# Patient Record
Sex: FEMALE | Race: BLACK OR AFRICAN AMERICAN | NOT HISPANIC OR LATINO | Employment: FULL TIME | URBAN - METROPOLITAN AREA
[De-identification: names, ages, dates, MRNs, and addresses within clinical notes are randomized per-mention and may not be internally consistent; named-entity substitution may affect disease eponyms.]

---

## 2017-09-26 ENCOUNTER — TRANSFERRED RECORDS (OUTPATIENT)
Dept: HEALTH INFORMATION MANAGEMENT | Facility: CLINIC | Age: 57
End: 2017-09-26

## 2017-12-03 ENCOUNTER — HEALTH MAINTENANCE LETTER (OUTPATIENT)
Age: 57
End: 2017-12-03

## 2020-03-02 ENCOUNTER — HEALTH MAINTENANCE LETTER (OUTPATIENT)
Age: 60
End: 2020-03-02

## 2020-12-20 ENCOUNTER — HEALTH MAINTENANCE LETTER (OUTPATIENT)
Age: 60
End: 2020-12-20

## 2021-04-18 ENCOUNTER — HEALTH MAINTENANCE LETTER (OUTPATIENT)
Age: 61
End: 2021-04-18

## 2021-07-28 ENCOUNTER — OFFICE VISIT (OUTPATIENT)
Dept: FAMILY MEDICINE | Facility: CLINIC | Age: 61
End: 2021-07-28
Payer: COMMERCIAL

## 2021-07-28 ENCOUNTER — TELEPHONE (OUTPATIENT)
Dept: PSYCHOLOGY | Facility: CLINIC | Age: 61
End: 2021-07-28

## 2021-07-28 ENCOUNTER — TELEPHONE (OUTPATIENT)
Dept: OTOLARYNGOLOGY | Facility: CLINIC | Age: 61
End: 2021-07-28

## 2021-07-28 VITALS
TEMPERATURE: 98.2 F | WEIGHT: 215.6 LBS | HEIGHT: 64 IN | SYSTOLIC BLOOD PRESSURE: 133 MMHG | DIASTOLIC BLOOD PRESSURE: 82 MMHG | HEART RATE: 87 BPM | BODY MASS INDEX: 36.81 KG/M2 | OXYGEN SATURATION: 95 %

## 2021-07-28 DIAGNOSIS — Z12.4 SCREENING FOR CERVICAL CANCER: ICD-10-CM

## 2021-07-28 DIAGNOSIS — K12.2 SUBMANDIBULAR ABSCESS: ICD-10-CM

## 2021-07-28 DIAGNOSIS — Z11.51 SCREENING FOR HUMAN PAPILLOMAVIRUS: ICD-10-CM

## 2021-07-28 DIAGNOSIS — Z12.11 SCREENING FOR COLON CANCER: ICD-10-CM

## 2021-07-28 DIAGNOSIS — I10 BENIGN ESSENTIAL HYPERTENSION: ICD-10-CM

## 2021-07-28 DIAGNOSIS — F33.1 MODERATE EPISODE OF RECURRENT MAJOR DEPRESSIVE DISORDER (H): ICD-10-CM

## 2021-07-28 DIAGNOSIS — Z12.31 VISIT FOR SCREENING MAMMOGRAM: ICD-10-CM

## 2021-07-28 DIAGNOSIS — Z86.19 HISTORY OF HEPATITIS C: ICD-10-CM

## 2021-07-28 DIAGNOSIS — H53.9 VISION CHANGES: ICD-10-CM

## 2021-07-28 DIAGNOSIS — R06.83 SNORING: ICD-10-CM

## 2021-07-28 DIAGNOSIS — Z11.4 ENCOUNTER FOR SCREENING FOR HIV: ICD-10-CM

## 2021-07-28 DIAGNOSIS — Z71.6 ENCOUNTER FOR SMOKING CESSATION COUNSELING: ICD-10-CM

## 2021-07-28 DIAGNOSIS — Z00.00 ROUTINE GENERAL MEDICAL EXAMINATION AT A HEALTH CARE FACILITY: Primary | ICD-10-CM

## 2021-07-28 DIAGNOSIS — Z76.89 ENCOUNTER TO ESTABLISH CARE WITH NEW DOCTOR: ICD-10-CM

## 2021-07-28 DIAGNOSIS — Z87.891 PERSONAL HISTORY OF TOBACCO USE: ICD-10-CM

## 2021-07-28 PROBLEM — F32.A DEPRESSION: Status: ACTIVE | Noted: 2021-07-28

## 2021-07-28 PROBLEM — R91.8 ABNORMAL FINDINGS ON DIAGNOSTIC IMAGING OF LUNG: Status: ACTIVE | Noted: 2019-03-22

## 2021-07-28 PROBLEM — T78.3XXA ANGIO-EDEMA: Status: ACTIVE | Noted: 2021-07-28

## 2021-07-28 LAB
ALBUMIN SERPL-MCNC: 3.8 G/DL (ref 3.4–5)
ALP SERPL-CCNC: 69 U/L (ref 40–150)
ALT SERPL W P-5'-P-CCNC: 20 U/L (ref 0–50)
ANION GAP SERPL CALCULATED.3IONS-SCNC: 7 MMOL/L (ref 3–14)
AST SERPL W P-5'-P-CCNC: 16 U/L (ref 0–45)
BILIRUB SERPL-MCNC: 0.3 MG/DL (ref 0.2–1.3)
BUN SERPL-MCNC: 13 MG/DL (ref 7–30)
CALCIUM SERPL-MCNC: 9.2 MG/DL (ref 8.5–10.1)
CHLORIDE BLD-SCNC: 108 MMOL/L (ref 94–109)
CO2 SERPL-SCNC: 23 MMOL/L (ref 20–32)
CREAT SERPL-MCNC: 0.71 MG/DL (ref 0.52–1.04)
CREAT UR-MCNC: 89 MG/DL
GFR SERPL CREATININE-BSD FRML MDRD: >90 ML/MIN/1.73M2
GLUCOSE BLD-MCNC: 86 MG/DL (ref 70–99)
POTASSIUM BLD-SCNC: 4.9 MMOL/L (ref 3.4–5.3)
PROT SERPL-MCNC: 7.8 G/DL (ref 6.8–8.8)
PROT UR-MCNC: 0.17 G/L
PROT/CREAT 24H UR: 0.19 G/G CR (ref 0–0.2)
SODIUM SERPL-SCNC: 138 MMOL/L (ref 133–144)

## 2021-07-28 PROCEDURE — 80053 COMPREHEN METABOLIC PANEL: CPT | Performed by: STUDENT IN AN ORGANIZED HEALTH CARE EDUCATION/TRAINING PROGRAM

## 2021-07-28 PROCEDURE — 36415 COLL VENOUS BLD VENIPUNCTURE: CPT | Performed by: STUDENT IN AN ORGANIZED HEALTH CARE EDUCATION/TRAINING PROGRAM

## 2021-07-28 PROCEDURE — 87522 HEPATITIS C REVRS TRNSCRPJ: CPT | Performed by: STUDENT IN AN ORGANIZED HEALTH CARE EDUCATION/TRAINING PROGRAM

## 2021-07-28 PROCEDURE — 87389 HIV-1 AG W/HIV-1&-2 AB AG IA: CPT | Performed by: STUDENT IN AN ORGANIZED HEALTH CARE EDUCATION/TRAINING PROGRAM

## 2021-07-28 PROCEDURE — 99214 OFFICE O/P EST MOD 30 MIN: CPT | Mod: 25 | Performed by: STUDENT IN AN ORGANIZED HEALTH CARE EDUCATION/TRAINING PROGRAM

## 2021-07-28 PROCEDURE — G0145 SCR C/V CYTO,THINLAYER,RESCR: HCPCS | Performed by: STUDENT IN AN ORGANIZED HEALTH CARE EDUCATION/TRAINING PROGRAM

## 2021-07-28 PROCEDURE — 84156 ASSAY OF PROTEIN URINE: CPT | Performed by: STUDENT IN AN ORGANIZED HEALTH CARE EDUCATION/TRAINING PROGRAM

## 2021-07-28 PROCEDURE — 99386 PREV VISIT NEW AGE 40-64: CPT | Mod: GC | Performed by: STUDENT IN AN ORGANIZED HEALTH CARE EDUCATION/TRAINING PROGRAM

## 2021-07-28 PROCEDURE — 87624 HPV HI-RISK TYP POOLED RSLT: CPT | Performed by: STUDENT IN AN ORGANIZED HEALTH CARE EDUCATION/TRAINING PROGRAM

## 2021-07-28 RX ORDER — NICOTINE 21 MG/24HR
1 PATCH, TRANSDERMAL 24 HOURS TRANSDERMAL
COMMUNITY
Start: 2021-07-16

## 2021-07-28 RX ORDER — CETIRIZINE HYDROCHLORIDE 10 MG/1
10 TABLET ORAL PRN
COMMUNITY
End: 2021-08-12

## 2021-07-28 RX ORDER — AMLODIPINE BESYLATE 10 MG/1
10 TABLET ORAL DAILY
COMMUNITY
Start: 2020-03-30 | End: 2021-08-17

## 2021-07-28 RX ORDER — SIMVASTATIN 20 MG
20 TABLET ORAL DAILY
COMMUNITY
Start: 2021-07-15 | End: 2021-08-17

## 2021-07-28 ASSESSMENT — ANXIETY QUESTIONNAIRES
GAD7 TOTAL SCORE: 7
7. FEELING AFRAID AS IF SOMETHING AWFUL MIGHT HAPPEN: SEVERAL DAYS
2. NOT BEING ABLE TO STOP OR CONTROL WORRYING: SEVERAL DAYS
6. BECOMING EASILY ANNOYED OR IRRITABLE: SEVERAL DAYS
5. BEING SO RESTLESS THAT IT IS HARD TO SIT STILL: NOT AT ALL
IF YOU CHECKED OFF ANY PROBLEMS ON THIS QUESTIONNAIRE, HOW DIFFICULT HAVE THESE PROBLEMS MADE IT FOR YOU TO DO YOUR WORK, TAKE CARE OF THINGS AT HOME, OR GET ALONG WITH OTHER PEOPLE: NOT DIFFICULT AT ALL
3. WORRYING TOO MUCH ABOUT DIFFERENT THINGS: MORE THAN HALF THE DAYS
1. FEELING NERVOUS, ANXIOUS, OR ON EDGE: SEVERAL DAYS

## 2021-07-28 ASSESSMENT — PATIENT HEALTH QUESTIONNAIRE - PHQ9
5. POOR APPETITE OR OVEREATING: SEVERAL DAYS
SUM OF ALL RESPONSES TO PHQ QUESTIONS 1-9: 7

## 2021-07-28 ASSESSMENT — MIFFLIN-ST. JEOR: SCORE: 1515.02

## 2021-07-28 NOTE — PATIENT INSTRUCTIONS
Patient Education   Here is the plan from today's visit    1. Routine general medical examination at a health care facility    2. Visit for screening mammogram  Mammogram referral  - MA SCREENING DIGITAL BILAT; Future    3. Screening for cervical cancer  We did a pap smear today.    4. Screening for colon cancer  Colonoscopy referral  - Adult Gastro Ref - Procedure Only; Future    6. Screening for human papillomavirus    7. Personal history of tobacco use  Lung cancer screening  - Prof fee: Shared Decisionmaking for Lung Cancer Screening  - CT Chest Lung Cancer Scrn Low Dose wo; Future    8. Encounter for smoking cessation counseling    9. Moderate episode of recurrent major depressive disorder (H)  - BEHAVIORAL HEALTH REFERRAL ([facility] interal and external)    10. Vision changes  - Adult Eye Referral; Future    11. Encounter for screening for HIV  - HIV Antigen Antibody Combo; Future    12. History of hepatitis C  - Hepatitis C RNA quantitative; Future    13. Benign essential hypertension  Continue taking amlodipine!  - Comprehensive Metabolic Panel; Future  - Protein  random urine with Creat Ratio; Future    14. Snoring  Sleepy study.   - SLEEP EVALUATION & MANAGEMENT REFERRAL - ADULT -; Future    15. Submandibular abscess  - OTOLARYNGOLOGY REFERRAL - INTERNAL    Please call or return to clinic if your symptoms don't go away.    Follow up plan  Return for 2 weeks, follow-up on mental health and asthma .    Thank you for coming to [facility] today.  COVID-19 Vaccine:  If you are eligible for the COVID-19 vaccine, you can schedule via [employer] or call [employer] at [phone]. If you need assistance with scheduling, please speak to a Care Coordinator or your provider.   Lab Testing:  **If you had lab testing today and your results are reassuring or normal they will be mailed to you or sent through [employer] within 7 days.   **If the lab tests need quick action we will call you with the results.  **If  you are having labs done on a different day, please call [phone] to schedule at [facility] or [phone] for other [facility] locations.   The phone number we will call with results is # [phone] (home) . If this is not the best number please call our clinic and change the number.  Medication Refills:  If you need any refills please call your pharmacy and they will contact us.   If you need to  your refill at a new pharmacy, please contact the new pharmacy directly. The new pharmacy will help you get your medications transferred faster.   Scheduling:  If you have any concerns about today's visit or wish to schedule another appointment please call our office during normal business hours [phone] (8-5:00 M-F)  If a referral was made to a [facility] and you don't get a call from central scheduling please call [phone].  If a Mammogram was ordered for you at [facility] call [phone] to schedule or change your appointment.  If you had an EKG/XRay/CT/Ultrasound/MRI ordered the number is [phone] to schedule or change your radiology appointment.   Medical Concerns:  If you have urgent medical concerns please call [phone] at any time of the day.    Shawna Pelayo, DO             Preventive Health Recommendations  Female Ages 50 - 64    Yearly exam: See your health care provider every year in order to  o Review health changes.   o Discuss preventive care.    o Review your medicines if your doctor has prescribed any.      Get a Pap test every three years (unless you have an abnormal result and your provider advises testing more often).    If you get Pap tests with HPV test, you only need to test every 5 years, unless you have an abnormal result.     You do not need a Pap test if your uterus was removed (hysterectomy) and you have not had cancer.    You should be tested each year for STDs (sexually transmitted diseases) if you're at risk.      Have a mammogram every 1 to 2 years.    Have a colonoscopy at age 50, or have a yearly FIT test (stool test). These exams screen for colon cancer.      Have a cholesterol test every 5 years, or more often if advised.    Have a diabetes test (fasting glucose) every three years. If you are at risk for diabetes, you should have this test more often.     If you are at risk for osteoporosis (brittle bone disease), think about having a bone density scan (DEXA).    Shots: Get a flu shot each year. Get a tetanus shot every 10 years.    Nutrition:     Eat at least 5 servings of fruits and vegetables each day.    Eat whole-grain bread, whole-wheat pasta and brown rice instead of white grains and rice.    Get adequate Calcium and Vitamin D.     Lifestyle    Exercise at least 150 minutes a week (30 minutes a day, 5 days a week). This will help you control your weight and prevent disease.    Limit alcohol to one drink per day.    No smoking.     Wear sunscreen to prevent skin cancer.     See your dentist every six months for an exam and cleaning.    See your eye doctor every 1 to 2 years.          Lung Cancer Screening   Frequently Asked Questions  If you are at high-risk for lung cancer, getting screened with low-dose computed tomography (LDCT) every year can help save your life. This handout offers answers to some of the most common questions about lung cancer screening. If you have other questions, please call [phone] ([phone]).     What is it?  Lung cancer screening uses special X-ray technology to create an image of your lung tissue. The exam is quick and easy and takes less than 10 seconds. We don t give you any medicine or use any needles. You can eat before and after the exam. You don t need to change your clothes as long as the clothing on your chest doesn t contain metal. But, you do need to be able to hold your breath for at least 6 seconds during the exam.    What is the goal of lung cancer  screening?  The goal of lung cancer screening is to save lives. Many times, lung cancer is not found until a person starts having physical symptoms. Lung cancer screening can help detect lung cancer in the earliest stages when it may be easier to treat.    Who should be screened for lung cancer?  We suggest lung cancer screening for anyone who is at high-risk for lung cancer. You are in the high-risk group if you:      are between the ages of 55 and 79, and    have smoked at least 1 pack of cigarettes a day for 30 or more years, and    still smoke or have quit within the past 15 years.    However, if you have a new cough or shortness of breath, you should talk to your doctor before being screened.    Some national lung health advocacy groups also recommend screening for people ages 50 to 79 who have smoked an average of 1 pack of cigarettes a day for 20 years. They must also have at least 1 other risk factor for lung cancer, not including exposure to secondhand smoke. Other risk factors are having had cancer in the past, emphysema, pulmonary fibrosis, COPD, a family history of lung cancer, or exposure to certain materials such as arsenic, asbestos, beryllium, cadmium, chromium, diesel fumes, nickel, radon or silica. Your care team can help you know if you have one of these risk factors.     Why does it matter if I have symptoms?  Certain symptoms can be a sign that you have a condition in your lungs that should be checked and treated by your doctor. These symptoms include fever, chest pain, a new or changing cough, shortness of breath that you have never felt before, coughing up blood or unexplained weight loss. Having any of these symptoms can greatly affect the results of lung cancer screening.       Should all smokers get an LDCT lung cancer screening exam?  It depends. Lung cancer screening is for a very specific group of men and women who have a history of heavy smoking over a long period of time (see  Who  should be screened for lung cancer  above).  I am in the high-risk group, but have been diagnosed with cancer in the past. Is LDCT lung cancer screening right for me?  In some cases, you should not have LDCT lung screening, such as when your doctor is already following your cancer with CT scan studies. Your doctor will help you decide if LDCT lung screening is right for you.  Do I need to have a screening exam every year?  Yes. If you are in the high-risk group described earlier, you should get an LDCT lung cancer screening exam every year until you are 79, or are no longer willing or able to undergo screening and possible procedures to diagnose and treat lung cancer.  How effective is LDCT at preventing death from lung cancer?  Studies have shown that LDCT lung cancer screening can lower the risk of death from lung cancer by 20 percent in people who are at high-risk.  What are the risks?  There are some risks and limitations of LDCT lung cancer screening. We want to make sure you understand the risks and benefits, so please let us know if you have any questions. Your doctor may want to talk with you more about these risks.    Radiation exposure: As with any exam that uses radiation, there is a very small increased risk of cancer. The amount of radiation in LDCT is small--about the same amount a person would get from a mammogram. Your doctor orders the exam when he or she feels the potential benefits outweigh the risks.    False negatives: No test is perfect, including LDCT. It is possible that you may have a medical condition, including lung cancer, that is not found during your exam. This is called a false negative result.    False positives and more testing: LDCT very often finds something in the lung that could be cancer, but in fact is not. This is called a false positive result. False positive tests often cause anxiety. To make sure these findings are not cancer, you may need to have more tests. These tests  will be done only if you give us permission. Sometimes patients need a treatment that can have side effects, such as a biopsy. For more information on false positives, see  What can I expect from the results?     Findings not related to lung cancer: Your LDCT exam also takes pictures of areas of your body next to your lungs. In a very small number of cases, the CT scan will show an abnormal finding in one of these areas, such as your kidneys, adrenal glands, liver or thyroid. This finding may not be serious, but you may need more tests. Your doctor can help you decide what other tests you may need, if any.  What can I expect from the results?  About 1 out of 4 LDCT exams will find something that may need more tests. Most of the time, these findings are lung nodules. Lung nodules are very small collections of tissue in the lung. These nodules are very common, and the vast majority--more than 97 percent--are not cancer (benign). Most are normal lymph nodes or small areas of scarring from past infections.  But, if a small lung nodule is found to be cancer, the cancer can be cured more than 90 percent of the time. To know if the nodule is cancer, we may need to get more images before your next yearly screening exam. If the nodule has suspicious features (for example, it is large, has an odd shape or grows over time), we will refer you to a specialist for further testing.  Will my doctor also get the results?  Yes. Your doctor will get a copy of your results.  Is it okay to keep smoking now that there s a cancer screening exam?  No. Tobacco is one of the strongest cancer-causing agents. It causes not only lung cancer, but other cancers and cardiovascular (heart) diseases as well. The damage caused by smoking builds over time. This means that the longer you smoke, the higher your risk of disease. While it is never too late to quit, the sooner you quit, the better.  Where can I find help to quit smoking?  The best way to  prevent lung cancer is to stop smoking. If you have already quit smoking, congratulations and keep it up! For help on quitting smoking, please call [employer] at 9-882-228-FJSZ ([phone]) or the [employer] at 1-551.318.8427 to find local resources near you.  One-on-one health coaching:  If you d prefer to work individually with a health care provider on tobacco cessation, we offer:      Medication Therapy Management:  Our specially trained pharmacists work closely with you and your doctor to help you quit smoking.  Call [phone] or [phone] (toll free).     Can Do: Health coaching offered by [facility].  [email or web address]

## 2021-07-28 NOTE — TELEPHONE ENCOUNTER
[facility] Call Center    Phone Message    May a detailed message be left on voicemail: no     Reason for Call: Appointment Intake    Referring Provider Name: Cesar Corrales MD in [facility]  Diagnosis and/or Symptoms: Submandibular abscess [K12.2]    Action Taken: Message routed to:  [facility] [[id number]]    Travel Screening: Not Applicable

## 2021-07-28 NOTE — TELEPHONE ENCOUNTER
Sent [employer] message with info on [facility] referral. Will update and close this encounter when message shows its been read.

## 2021-07-28 NOTE — PROGRESS NOTES
"  Female Physical Note          HPI         Concerns today:     Here to establish care with new primary care doctor  Hasn't had insurance in many years  No recent primary care    \"food getting stuck in my pouch\"  ENT removed gland ~ 2017 or 2018   [facility]   Feels she has a pouch in her mouth? Food get stuck in there  Office fee was too much to return to previous ENT  Submandibular abscess was removed from chart review   It was only like 1-2 years ago feels food gets stuck in this submandibular pouch  Tried to go back to [facility] [place] did procedure, but couldn't afford co pay  Hoping to see another ENT    Hep C - Chronic hep C treatment, HCV RNA not done at end point    Vision - Issues with far distant and reading sometimes hard to see   Doesn't drive at night due to fears of vision.     Dental - Has dentures, is up to date she believes     Pap - maybe more than 5 years ago or more. Never had abnormal pap smear.   Mammogram - 2011 or 2012, she believes was fine   Colon cancer screening - Never has.     Smoking/Alcohol/Other drugs:  Smoke: Started smoking when in 1974.Tobacco, just started patches. They are going well. Psychologically she feels dependent on them. Puts them on at night. Still smoking 4 a day. When smoking the most pack a day.   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 No therapist. Used to have some at [facility] clinic in 2011 and 2012.      Living situation: safe at home, just her  Support system: baby brother, mom, employer     Come back to vaccines another visit   Is COVID-19 vaccinated   Blood pressure & Asthma - will need separate visit to " discuss    Previous Medical Care  Immunization record    HTN - got angioedema from lisinopril      Patient Active Problem List   Diagnosis     Chronic hepatitis C (H)     Asthma     Itching     Personal history of tobacco use     Stroke (H)     Abnormal findings on diagnostic imaging of lung     Benign essential hypertension     Depression     Angio-edema     Submandibular abscess     Snoring       Past Medical History:   Diagnosis Date     Hyperlipidemia      Hypertension        Previous Medical Care   See care everywhere     History reviewed. No pertinent family history.           Review of Systems:     Review of Systems:  10 point ROS negative unless noted here or HPI    Sleep:   Do you snore most or the night (as reported by a family member)? Yes  Do you feel sleepy or extremely tired during most of the day? No, does feel in general fatigued  Hasn't been told had sleep apnea before.   Snoring wakes her up at night.          Social History     Social History     Socioeconomic History     Marital status: Single     Spouse name: Not on file     Number of children: Not on file     Years of education: Not on file     Highest education level: Not on file   Occupational History     Not on file   Tobacco Use     Smoking status: Current Every Day Smoker     Types: Cigarettes     Smokeless tobacco: Never Used     Tobacco comment: 4 cigarettes daily   Vaping Use     Vaping Use: Never used   Substance and Sexual Activity     Alcohol use: No     Drug use: No     Sexual activity: Not Currently     Birth control/protection: None   Other Topics Concern     Parent/sibling w/ CABG, MI or angioplasty before 65F 55M? Not Asked   Social History Narrative     Not on file     Social Determinants of Health     Financial Resource Strain:      Difficulty of Paying Living Expenses:    Food Insecurity:      Worried About Running Out of Food in the Last Year:      Ran Out of Food in the Last Year:    Transportation Needs:      Lack of  "Transportation (Medical):      Lack of Transportation (Non-Medical):    Physical Activity:      Days of Exercise per Week:      Minutes of Exercise per Session:    Stress:      Feeling of Stress :    Social Connections:      Frequency of Communication with Friends and Family:      Frequency of Social Gatherings with Friends and Family:      Attends Alevism Services:      Active Member of Clubs or Organizations:      Attends Club or Organization Meetings:      Marital Status:    Intimate Partner Violence:      Fear of Current or Ex-Partner:      Emotionally Abused:      Physically Abused:      Sexually Abused:        Marital Status:Single  Who lives in your household? Lives by herself  Feels safe at home    Sexual Health     Sexual concerns: No   Last Pap Smear Date: No results found for: PAP  Abnormal Pap History: None  Last pap over 5 years ago.     Recommended Screening     Pap/HPV cotest every 5 years for women 30-65   Recommended and patient accepted testing. and HIV screening:  Recommended and patient accepted testing.  Colon CA Screening (>50-75 ):  Recommended and patient accepted testing., Breast CA Screening (>41 yo or 10 y before 1st degree relative diagnosis): Recommended and patient accepted testing. and Lung CA Screening with low dose CT (55 - 80, with >= 30 ppy smoking history who are current smokers or quit within last 15y - annual low dose CT) Recommended and patient accepted testing.         Physical Exam:     Vitals: /82   Pulse 87   Temp 98.2  F (36.8  C) (Oral)   Ht 1.613 m (5' 3.5\")   Wt 97.8 kg (215 lb 9.6 oz)   LMP  (LMP Unknown)   SpO2 95%   Breastfeeding No   BMI 37.59 kg/m    BMI= Body mass index is 37.59 kg/m .     GENERAL: healthy, alert and no distress  EYES: Eyes grossly normal to inspection, extraocular movements - intact, and PERRL  HENT: ear canals- normal; TMs- normal; Nose- normal; Mouth- no ulcers, no lesions. No tenderness to palpation of floor or roof of mouth. " No swelling or erythema.   NECK: right side notable scar on lateral neck and skin divot asymmetric to other side, submandibular fullness, no tenderness, no adenopathy,  no masses, no stiffness; thyroid- normal to palpation  RESP: Few expiratory wheezes. No rhonchi or rales  BREAST: Differed exam  CV: regular rates and rhythm, normal S1 S2, no S3 or S4 and no murmur, no click or rub -  ABDOMEN: soft, no tenderness, normal bowel sounds  MS: extremities- no gross deformities noted, no edema  SKIN: no suspicious lesions, no rashes  NEURO: strength and tone- normal, sensory exam- grossly normal, mentation- intact, speech- normal, reflexes- symmetric  BACK: no paralumbar tenderness  - female: cervix- normal, adnexae- normal; uterus- normal, no masses, no discharge  RECTAL- female: differed exam  PSYCH: Alert and oriented times 3; speech- coherent , normal rate and volume; able to articulate logical thoughts, able to abstract reason, no tangential thoughts, no hallucinations or delusions, affect- normal  LYMPHATICS: ant. cervical- normal, post. cervical- normal, axillary- normal, supraclavicular- normal    Assessment and Plan      Vandana was seen today for physical, mass and mouth problem.    Diagnoses and all orders for this visit:    Routine general medical examination at a health care facility  Encounter to establish care with new doctor  Pt new to my care. Discussed with her it will take a few visits to work on both her acute and chronic concerns. Encouraged her in establishing health care with a primary physician. Future visits would like to follow up on numerous referrals today as well as discuss 1) uncontrolled asthma 2) mental health. Has had COVID-19 immunizations, would like to discuss others at future visit (tdap, PPSV23, zoster).     Visit for screening mammogram  -     MA SCREENING DIGITAL BILAT; Future    Screening for cervical cancer  -     Pap imaged thin layer screen with HPV - recommended age 30 - 65  years    Screening for colon cancer  -     Adult Gastro Ref - Procedure Only; Future    Screening for human papillomavirus  -     Pap imaged thin layer screen with HPV - recommended age 30 - 65 years    Personal history of tobacco use  Encounter for smoking cessation counseling  Pt currently is using nicotine patches, still smoking ~4 cigarettes a day. Has cut back from about a pack a day. Motivational interviewing done.   -     Prof fee: Shared Decisionmaking for Lung Cancer Screening  -     CT Chest Lung Cancer Scrn Low Dose wo; Future    Moderate episode of recurrent major depressive disorder (H)  Would like to explore more in future visits. Seems consistent with MDD with VALENTINA, would not rule out PTSD. Appears to have a lot of stressful life situations currently happening. She has been on selective serotonin reuptake inhibitor before, not currently taking. Is contemplative about mental health medications, could possible start next visit after more thorough history. Referred to our behavioral health to get connected with psychotherapy. When referring, I would be mindful of insurance and financial limitations as this has been a large barrier of care in the past.   -     BEHAVIORAL HEALTH REFERRAL ([facility] interal and external)    Vision changes  Optometry referral, pt likely needs glasses.   -     Adult Eye Referral; Future    Encounter for screening for HIV  -     HIV Antigen Antibody Combo    History of hepatitis C  Patient completed 8 weeks of Harvoni on 06/09/15. Patient did not have an end of treatment quant drawn.  -     Hepatitis C RNA quantitative    Benign essential hypertension  On repeat blood pressure, controlled today. Goal < 140/90. Has had angioedema to lisinopril in the past. In the future will re get her lipid panel (just started Simvastatin) and at that time would get an A1c for DM screening. Continue Amlodipine 10 mg.   -     Comprehensive Metabolic Panel; Future  -     Protein  random urine with  "Creat Ratio; Future  -     Comprehensive Metabolic Panel  -     Protein  random urine with Creat Ratio    Snoring  Concern for sleep apnea based on STOP BANG score of 5.   -     SLEEP EVALUATION & MANAGEMENT REFERRAL - ADULT -; Future    Submandibular abscess  Unclear etiology of why she is having \"food\" stuck in what she feels is a \"pouch\" (points to submantibular space) where she had a procedure for a submandibular abscess, seems like excision of submandibular gland in 2014 at [facility]. Will refer to [facility] (cannot go to original ENT who did procedure as not covered by insurance). No erythema, tenderness, swelling on exam  -     OTOLARYNGOLOGY REFERRAL - INTERNAL      Options for treatment and follow-up care were reviewed with the patient . Vandana Crowder and/or guardian engaged in the decision making process and verbalized understanding of the options discussed and agreed with the final plan.    Shawna Pelayo, DO           Lung Cancer Screening Shared Decision Making Visit     Vandana PETER Crowder is eligible for lung cancer screening on the basis of the information provided in my signed lung cancer screening order.     I have discussed with patient the risks and benefits of screening for lung cancer with low-dose CT.     The risks include:  radiation exposure: one low dose chest CT has as much ionizing radiation as about 15 chest x-rays or 6 months of background radiation living in [place]    false positives: 96% of positive findings/nodules are NOT cancer, but some might still require additional diagnostic evaluation, including biopsy  over-diagnosis: some slow growing cancers that might never have been clinically significant will be detected and treated unnecessarily     The benefit of early detection of lung cancer is contingent upon adherence to annual screening or more frequent follow up if indicated.     Furthermore, reaping the benefits of screening requires Vandana Crowder to be willing and " physically able to undergo diagnostic procedures, if indicated. Although no specific guide is available for determining severity of comorbidities, it is reasonable to withhold screening in patients who have greater mortality risk from other diseases.     We did discuss that the only way to prevent lung cancer is to not smoke. Smoking cessation counseling was not given.

## 2021-07-29 ENCOUNTER — TELEPHONE (OUTPATIENT)
Dept: OTOLARYNGOLOGY | Facility: CLINIC | Age: 61
End: 2021-07-29

## 2021-07-29 LAB
HCV RNA SERPL NAA+PROBE-ACNC: NOT DETECTED IU/ML
HIV 1+2 AB+HIV1 P24 AG SERPL QL IA: NONREACTIVE

## 2021-07-29 ASSESSMENT — ASTHMA QUESTIONNAIRES: ACT_TOTALSCORE: 13

## 2021-07-29 ASSESSMENT — ANXIETY QUESTIONNAIRES: GAD7 TOTAL SCORE: 7

## 2021-07-29 NOTE — TELEPHONE ENCOUNTER
LVM to schedule next available new appt with [facility] (Clemencia Sherman or Yaron)    Visit type : [facility] NEW    Gave call center number

## 2021-07-30 NOTE — PROGRESS NOTES
Preceptor Attestation:   Patient seen, evaluated and discussed with the resident. I have verified the content of the note, which accurately reflects my assessment of the patient and the plan of care.   Supervising Physician:  Cesar Corrales MD

## 2021-08-02 LAB
BKR LAB AP GYN ADEQUACY: NORMAL
BKR LAB AP GYN INTERPRETATION: NORMAL
BKR LAB AP HPV REFLEX: NORMAL
BKR LAB AP PREVIOUS ABNORMAL: NORMAL
PATH REPORT.COMMENTS IMP SPEC: NORMAL
PATH REPORT.RELEVANT HX SPEC: NORMAL

## 2021-08-03 NOTE — TELEPHONE ENCOUNTER
FUTURE VISIT INFORMATION      FUTURE VISIT INFORMATION:    Date: 8/4/2021    Time: 3:20PM    [place] [facility] [place]  REFERRAL INFORMATION:    Referring provider: Shawna Pelayo DO    Referring providers clinic:  [facility]     Reason for visit/diagnosis  Submandibular abscess per pt referral and recs in epic ok to schedule per Bill    RECORDS REQUESTED FROM:       Clinic name Comments Records Status Imaging Status   [facility]  7/28/2201 note from Shawna Pelayo DO [facility] 12/5/2014 EXICSION RIGHT SUBMANDIBULAR GLAND Care everywhere     [facility] LABORATORY-CENTRAL LABORATORY   12/5/2014 RIGHT SUBMANDIBULAR GLAND, RESECTION (Case: [id number]) Care everywhere     [facility] 10/16/2018 CT Angio Head and Neck   12/11/2014 CT NEck   11/10/2014 CT Neck  Care everywhere req 8/3/21 - PACS                 8/3/2021 11:35AM sent a message to [facility] for images - Amay   *images received in PACS - Amay

## 2021-08-04 ENCOUNTER — PRE VISIT (OUTPATIENT)
Dept: OTOLARYNGOLOGY | Facility: CLINIC | Age: 61
End: 2021-08-04

## 2021-08-04 VITALS — BODY MASS INDEX: 36.81 KG/M2 | HEIGHT: 64 IN | WEIGHT: 215.6 LBS

## 2021-08-04 LAB
HUMAN PAPILLOMA VIRUS 16 DNA: NEGATIVE
HUMAN PAPILLOMA VIRUS 18 DNA: NEGATIVE
HUMAN PAPILLOMA VIRUS FINAL DIAGNOSIS: NORMAL
HUMAN PAPILLOMA VIRUS OTHER HR: NEGATIVE

## 2021-08-04 ASSESSMENT — MIFFLIN-ST. JEOR: SCORE: 1515.09

## 2021-08-04 NOTE — PROGRESS NOTES
Vandana Crowder is a 61 year old who is being evaluated via a billable video visit.      How would you like to obtain your AVS? MyChart  If the video visit is dropped, the invitation should be resent by: Text to cell phone: [phone] (pt unable to log in via My Chart Link needed via Text)  Will anyone else be joining your video visit? No    Does patient have any form of state insurance?Yes   Do you have wifi? Yes  Do you have a smart phone/device?Yes  Can you download an gabriella on your phone comfortably with out assistance including You Tube? Yes            No Cerna MA      Video-Visit Details    Type of service:  Video Visit    Video Start Time: 9:01 AM    Video End Time:9:40 AM    Originating Location (pt. Location): Other work    Distant Location (provider location):  [facility] office    Platform used for Video Visit: [employer]     Chief complaint: Consultation requested by Shawna Pelayo DO for evaluation of snoring and shortness of breath    History of Present Illness: 61-year-old female with history of hypertension, hyperlipidemia, sober from cocaine and heroin, long history of smoking.  She complains of significant weight gain over the last 5 years affecting her breathing.  She states that she gets short of breath changing body positions at night and feels her heart racing.  She typically gets up a couple times to go to the bathroom and is short of breath with walking.  She has been told about coughing and gagging at night by her roommate.  However she is not always aware of the cough.  She has woken herself up with snoring.  She denies waking up with dry mouth, sore throat or headaches.  She denies nocturnal reflux symptoms.  She does have headaches during the day that seem to come out of nowhere.    She occasionally has a sense of restlessness and tossing and turning that affects her ability to fall asleep.  No history of sleepwalking, sleep talking, dream  enactment behavior.  She typically gets into bed around 9 and gets out of bed around 6.  She does not take naps during the day because she is too busy but does have sleepiness.  During the day at work if there is some downtime she may close her eyes and rest.  On workdays she may drink 1 to 2 cups of coffee otherwise she does not drink coffee on nonwork days.  She does not use any sleep aids.  She works as a caregiver in a residential setting.  At work she uses the lift chair to go up the stairs because of shortness of breath.  She states she would have no problem with that 5 years ago.  She has been working on quitting smoking but continues to smoke 3 to 4 cigarettes a day.    She denies PND or orthopnea; she does use some pillows to make her shoulder and neck comfortable at night.        Campo Sleepiness Scale   Sitting and reading: High chance of dozing   Watching TV: Moderate chance of dozing   Sitting, inactive in a public place (e.g. a theatre or a meeting): Would never doze   As a passenger in a car for an hour without a break: High chance of dozing   Lying down to rest in the afternoon when circumstances permit: Moderate chance of dozing   Sitting and talking to someone: Slight chance of dozing   Sitting quietly after a lunch without alcohol: Slight chance of dozing   In a car, while stopped for a few minutes in traffic: Would never doze   Total score - Campo: 12     (Less than 10 normal)    Insomnia Severity Scale  ANNALISE Total Score: 10  Total score categories:  0-7 = No clinically significant insomnia   8-14 = Subthreshold insomnia   15-21 = Clinical insomnia (moderate severity)  22-28 = Clinical insomnia (severe)    STOP-BANG  Loud Snore   0  Excessively Tired/Sleepy   1  Observed apnea   0-1  Hypertension   1  BMI> 35 kg/m2   1  Age >50   1  Neck >16 in/40cm   ?  Male Gender   0  Total =   4-5  (0-2 low, 3-4 intermediate, 5-8 high risk of RICO)      Past Medical History:   Diagnosis Date      Hyperlipidemia      Hypertension        No Known Allergies    Current Outpatient Medications   Medication     albuterol (PROAIR HFA, PROVENTIL HFA, VENTOLIN HFA) 108 (90 BASE) MCG/ACT inhaler     amLODIPine (NORVASC) 10 MG tablet     aspirin (ASA) 81 MG EC tablet     cetirizine (ZYRTEC) 10 MG tablet     nicotine (NICODERM CQ) 14 MG/24HR 24 hr patch     simvastatin (ZOCOR) 20 MG tablet     No current facility-administered medications for this visit.       Social History     Socioeconomic History     Marital status: Single     Spouse name: Not on file     Number of children: Not on file     Years of education: Not on file     Highest education level: Not on file   Occupational History     Not on file   Tobacco Use     Smoking status: Current Every Day Smoker     Types: Cigarettes     Smokeless tobacco: Never Used     Tobacco comment: 4 cigarettes daily   Vaping Use     Vaping Use: Never used   Substance and Sexual Activity     Alcohol use: No     Drug use: No     Sexual activity: Not Currently     Birth control/protection: None   Other Topics Concern     Parent/sibling w/ CABG, MI or angioplasty before 65F 55M? Not Asked   Social History Narrative     Not on file     Social Determinants of Health     Financial Resource Strain:      Difficulty of Paying Living Expenses:    Food Insecurity:      Worried About Running Out of Food in the Last Year:      Ran Out of Food in the Last Year:    Transportation Needs:      Lack of Transportation (Medical):      Lack of Transportation (Non-Medical):    Physical Activity:      Days of Exercise per Week:      Minutes of Exercise per Session:    Stress:      Feeling of Stress :    Social Connections:      Frequency of Communication with Friends and Family:      Frequency of Social Gatherings with Friends and Family:      Attends Muslim Services:      Active Member of Clubs or Organizations:      Attends Club or Organization Meetings:      Marital Status:    Intimate Partner  "Violence:      Fear of Current or Ex-Partner:      Emotionally Abused:      Physically Abused:      Sexually Abused:        No family history on file.    Review of Systems: Positve for HPI, otherwise comprehensive review of systems is negative.    EXAM:  Ht 1.613 m (5' 3.5\")   Wt 97.8 kg (215 lb 9.6 oz)   LMP  (LMP Unknown)   BMI 37.59 kg/m    GENERAL: Alert and no distress  EYES: Eyes grossly normal to inspection.  No discharge or erythema, or obvious scleral/conjunctival abnormalities.  RESP: No audible wheeze, cough, or visible cyanosis.  No visible retractions or increased work of breathing.    SKIN: Visible skin clear. No significant rash, abnormal pigmentation or lesions.  NEURO: Cranial nerves grossly intact.  Mentation and speech appropriate for age.  PSYCH: Mentation appears normal, affect normal, judgement and insight intact, normal speech and appearance well-groomed.       EXAM: CT CHEST PULMONARY ANGIO 5/6/2019 11:06 PM. ([facility])  COMPARISON: None.   CLINICAL DATA: Chest pain, acute, PE suspected, intermed prob, positive D-dimer   TECHNIQUE: A CT angiogram (CTA) of the pulmonary arteries and chest was performed.  Specifically, preliminary unenhanced images were obtained through the pulmonary arteries.  Following this, during bolus infusion of intravenous contrast, thin-section contiguous transaxial images were obtained through the thorax.  In addition, multiplanar and three dimensional (3D) reformations through the pulmonary arterial tree were generated from the acquisition scanner and reviewed. A total of 100 cc of Omnipaque 350 was administered intravenously for this study.   FINDINGS:   Pulmonary Arteries: No filling defects are identified in the main, right and left pulmonary arteries. No filling defects are visible within the lobar or peripheral arteries.     Lungs: Minimal dependent density in both lower lobes. Paraseptal and centrilobular emphysema most conspicuous in the upper " lobes.  No mass or consolidation.   Mediastinum:  No enlarged lymph nodes.  No pericardial effusion.  Small hiatal hernia.   Pleura:  No pleural effusions.  No pleural gas.   Included upper abdomen has normal appearance.    Free Thyroxine 1.12 3/23/2019    ASSESSMENT:  61-year-old female with history of hypertension, asthma, obesity, smoking with nocturnal cough and gas seen, snoring, daytime sleepiness and dyspnea with minimal activity.  She is at risk for sleep disordered breathing.  She has abnormal imaging with CT scanning showing emphysema and is at also risk for COPD.    PLAN:  Patient is encouraged to continue her efforts at smoking cessation.  She is also encouraged to stay as active as possible with walking on flat surfaces.  Recommended comprehensive pulmonary function testing to better evaluate asthma/COPD risk.  And recommended in lab polysomnography for accurate evaluation of sleep disordered breathing.  We reviewed the potential benefits of treatment of sleep disordered breathing including resolution of daytime sleepiness and improved breathing at night, decrease cardiovascular risk.  Patient is familiar with CPAP and would be open to a trial of CPAP if indicated.  Is unable to find any thyroid function testing since 2019.  This could be reassessed.    63 minutes spent on the date of the encounter doing chart review, history and exam, documentation and further activities per the note    Clemencia Ayala M.D.  Pulmonary/Critical Care/Sleep Medicine    [facility]   Floor [address], [address]   [address] [place] [address]. [place] [address]   Appointments: [phone]    The above note was dictated using voice recognition software and may include typographical errors. Please contact the author for any clarifications.

## 2021-08-04 NOTE — TELEPHONE ENCOUNTER
FUTURE VISIT INFORMATION      FUTURE VISIT INFORMATION:    Date: 8/11/2021    Time: 3:20PM    [place] [facility] [place]AL INFORMATION:    Referring provider:  Shawna Pelayo DO    Referring providers clinic:  [facility]     Reason for visit/diagnosis  Submandibular abscess per pt referral and recs in epic ok to schedule per Bill    RECORDS REQUESTED FROM:       Clinic name Comments Records Status Imaging Status   [facility]  7/28/2201 note from Shawna Pelayo DO [facility] 12/5/2014 EXICSION RIGHT SUBMANDIBULAR GLAND Care everywhere      [facility] LABORATORY-CENTRAL LABORATORY   12/5/2014 RIGHT SUBMANDIBULAR GLAND, RESECTION (Case: [id number]) Care everywhere      [facility] 10/16/2018 CT Angio Head and Neck   12/11/2014 CT NEck   11/10/2014 CT Neck  Care everywhere req 8/3/21 - PACS

## 2021-08-05 ENCOUNTER — VIRTUAL VISIT (OUTPATIENT)
Dept: SLEEP MEDICINE | Facility: CLINIC | Age: 61
End: 2021-08-05
Attending: FAMILY MEDICINE
Payer: COMMERCIAL

## 2021-08-05 DIAGNOSIS — Z87.891 PERSONAL HISTORY OF TOBACCO USE: ICD-10-CM

## 2021-08-05 DIAGNOSIS — E66.01 MORBID OBESITY (H): ICD-10-CM

## 2021-08-05 DIAGNOSIS — R06.83 SNORING: ICD-10-CM

## 2021-08-05 DIAGNOSIS — R91.8 ABNORMAL FINDINGS ON DIAGNOSTIC IMAGING OF LUNG: ICD-10-CM

## 2021-08-05 DIAGNOSIS — R06.00 DYSPNEA, UNSPECIFIED TYPE: Primary | ICD-10-CM

## 2021-08-05 DIAGNOSIS — R05.9 COUGH: ICD-10-CM

## 2021-08-05 DIAGNOSIS — G47.10 HYPERSOMNIA: ICD-10-CM

## 2021-08-05 DIAGNOSIS — J45.909 PERSISTENT ASTHMA WITHOUT COMPLICATION, UNSPECIFIED ASTHMA SEVERITY: ICD-10-CM

## 2021-08-05 PROCEDURE — 99205 OFFICE O/P NEW HI 60 MIN: CPT | Mod: 95 | Performed by: INTERNAL MEDICINE

## 2021-08-05 NOTE — PATIENT INSTRUCTIONS
"      MY TREATMENT INFORMATION FOR SLEEP APNEA-  Vandana Crowder    DOCTOR : Clemencia Ayala MD    Am I having a sleep study at a sleep center?  --->Due to insurance clearance delays, you will be contacted within 2-4 weeks to schedule    Am I having a home sleep study?  --->Watch the video for the device you are using:    -/drop off device-   https://www.Fruitday.comube.com/watch?v=yGGFBdELGhk    -Disposable device sent out require phone/computer application-   https://www.eDabba.com/watch?v=BCce_vbiwxE      Frequently asked questions:  1. What is Obstructive Sleep Apnea (RICO)? RICO is the most common type of sleep apnea. Apnea means, \"without breath.\"  Apnea is most often caused by narrowing or collapse of the upper airway as muscles relax during sleep.   Almost everyone has occasional apneas. Most people with sleep apnea have had brief interruptions at night frequently for many years.  The severity of sleep apnea is related to how frequent and severe the events are.   2. What are the consequences of RICO? Symptoms include: feeling sleepy during the day, snoring loudly, gasping or stopping of breathing, trouble sleeping, and occasionally morning headaches or heartburn at night.  Sleepiness can be serious and even increase the risk of falling asleep while driving. Other health consequences may include development of high blood pressure and other cardiovascular disease in persons who are susceptible. Untreated RICO  can contribute to heart disease, stroke and diabetes.   3. What are the treatment options? In most situations, sleep apnea is a lifelong disease that must be managed with daily therapy. Medications are not effective for sleep apnea and surgery is generally not considered until other therapies have been tried. Your treatment is your choice . Continuous Positive Airway (CPAP) works right away and is the therapy that is effective in nearly everyone. An oral device to hold your jaw forward is usually " the next most reliable option. Other options include postioning devices (to keep you off your back), weight loss, and surgery including a tongue pacing device. There is more detail about some of these options below.  4. Are my sleep studies covered by insurance? Although we will request verification of coverage, we advise you also check in advance of the study to ensure there is coverage.    Important tips for those choosing CPAP and similar devices   Know your equipment:  CPAP is continuous positive airway pressure that prevents obstructive sleep apnea by keeping the throat from collapsing while you are sleeping. In most cases, the device is  smart  and can slowly self-adjusts if your throat collapses and keeps a record every day of how well you are treated-this information is available to you and your care team.  BPAP is bilevel positive airway pressure that keeps your throat open and also assists each breath with a pressure boost to maintain adequate breathing.  Special kinds of BPAP are used in patients who have inadequate breathing from lung or heart disease. In most cases, the device is  smart  and can slowly self-adjusts to assist breathing. Like CPAP, the device keeps a record of how well you are treated.  Your mask is your connection to the device. You get to choose what feels most comfortable and the staff will help to make sure if fits. Here: are some examples of the different masks that are available:       Key points to remember on your journey with sleep apnea:  1. Sleep study.  PAP devices often need to be adjusted during a sleep study to show that they are effective and adjusted right.  2. Good tips to remember: Try wearing just the mask during a quiet time during the day so your body adapts to wearing it. A humidifier is recommended for comfort in most cases to prevent drying of your nose and throat. Allergy medication from your provider may help you if you are having nasal congestion.  3. Getting  settled-in. It takes more than one night for most of us to get used to wearing a mask. Try wearing just the mask during a quiet time during the day so your body adapts to wearing it. A humidifier is recommended for comfort in most cases. Our team will work with you carefully on the first day and will be in contact within 4 days and again at 2 and 4 weeks for advice and remote device adjustments. Your therapy is evaluated by the device each day.   4. Use it every night. The more you are able to sleep naturally for 7-8 hours, the more likely you will have good sleep and to prevent health risks or symptoms from sleep apnea. Even if you use it 4 hours it helps. Occasionally all of us are unable to use a medical therapy, in sleep apnea, it is not dangerous to miss one night.   5. Communicate. Call our skilled team on the number provided on the first day if your visit for problems that make it difficult to wear the device. Over 2 out of 3 patients can learn to wear the device long-term with help from our team. Remember to call our team or your sleep providers if you are unable to wear the device as we may have other solutions for those who cannot adapt to mask CPAP therapy. It is recommended that you sleep your sleep provider within the first 3 months and yearly after that if you are not having problems.   6. Use it for your health. We encourage use of CPAP masks during daytime quiet periods to allow your face and brain to adapt to the sensation of CPAP so that it will be a more natural sensation to awaken to at night or during naps. This can be very useful during the first few weeks or months of adapting to CPAP though it does not help medically to wear CPAP during wakefulness and  should not be used as a strategy just to meet guidelines.  7. Take care of your equipment. Make sure you clean your mask and tubing using directions every day and that your filter and mask are replaced as recommended or if they are not  working.     BESIDES CPAP, WHAT OTHER THERAPIES ARE THERE?    Positioning Device  Positioning devices are generally used when sleep apnea is mild and only occurs on your back.This example shows a pillow that straps around the waist. It may be appropriate for those whose sleep study shows milder sleep apnea that occurs primarily when lying flat on one's back. Preliminary studies have shown benefit but effectiveness at home may need to be verified by a home sleep test. These devices are generally not covered by medical insurance.  Examples of devices that maintain sleeping on the back to prevent snoring and mild sleep apnea.    Belt type body positioner  http://Placecast.QPD/    Electronic reminder  http://nightshifttherapy.com/  http://www.TransMed Systems.QPD.au/      Oral Appliance  What is oral appliance therapy?  An oral appliance device fits on your teeth at night like a retainer used after having braces. The device is made by a specialized dentist and requires several visits over 1-2 months before a manufactured device is made to fit your teeth and is adjusted to prevent your sleep apnea. Once an oral device is working properly, snoring should be improved. A home sleep test may be recommended at that time if to determine whether the sleep apnea is adequately treated.       Some things to remember:  -Oral devices are often, but not always, covered by your medical insurance. Be sure to check with your insurance provider.   -If you are referred for oral therapy, you will be given a list of specialized dentists to consider or you may choose to visit the Web site of the American Academy of Dental Sleep Medicine  -Oral devices are less likely to work if you have severe sleep apnea or are extremely overweight.     More detailed information  An oral appliance is a small acrylic device that fits over the upper and lower teeth  (similar to a retainer or a mouth guard). This device slightly moves jaw forward, which moves the base  of the tongue forward, opens the airway, improves breathing for effective treat snoring and obstructive sleep apnea in perhaps 7 out of 10 people .  The best working devices are custom-made by a dental device  after a mold is made of the teeth 1, 2, 3.  When is an oral appliance indicated?  Oral appliance therapy is recommended as a first-line treatment for patients with primary snoring, mild sleep apnea, and for patients with moderate sleep apnea who prefer appliance therapy to use of CPAP4, 5. Severity of sleep apnea is determined by sleep testing and is based on the number of respiratory events per hour of sleep.   How successful is oral appliance therapy?  The success rate of oral appliance therapy in patients with mild sleep apnea is 75-80% while in patients with moderate sleep apnea it is 50-70%. The chance of success in patients with severe sleep apnea is 40-50%. The research also shows that oral appliances have a beneficial effect on the cardiovascular health of RICO patients at the same magnitude as CPAP therapy7.  Oral appliances should be a second-line treatment in cases of severe sleep apnea, but if not completely successful then a combination therapy utilizing CPAP plus oral appliance therapy may be effective. Oral appliances tend to be effective in a broad range of patients although studies show that the patients who have the highest success are females, younger patients, those with milder disease, and less severe obesity. 3, 6.   Finding a dentist that practices dental sleep medicine  Specific training is available through the American Academy of Dental Sleep Medicine for dentists interested in working in the field of sleep. To find a dentist who is educated in the field of sleep and the use of oral appliances, near you, visit the Web site of the American Academy of Dental Sleep Medicine.    References  1. roberta Whitaker al. Objectively measured vs self-reported compliance during oral  appliance therapy for sleep-disordered breathing. Chest 2013; 144(5): 4679-8838.  2. Roland, et al. Objective measurement of compliance during oral appliance therapy for sleep-disordered breathing. Thorax 2013; 68(1): 91-96.  3. Lizzeth et al. Mandibular advancement devices in 620 men and women with RICO and snoring: tolerability and predictors of treatment success. Chest 2004; 125: 0510-2777.  4. Vega, et al. Oral appliances for snoring and RICO: a review. Sleep 2006; 29: 244-262.  5. Tori et al. Oral appliance treatment for RICO: an update. J Clin Sleep Med 2014; 10(2): 215-227.  6. Peggy et al. Predictors of OSAH treatment outcome. J Dent Res 2007; 86: 6795-1493.      Weight Loss:    Weight loss is a long-term strategy that may improve sleep apnea in some patients.    Weight management is a personal decision and the decision should be based on your interest and the potential benefits.  If you are interested in exploring weight loss strategies, the following discussion covers the impact on weight loss on sleep apnea and the approaches that may be successful.    Being overweight does not necessarily mean you will have health consequences.  Those who have BMI over 35 or over 27 with existing medical conditions carries greater risk.   Weight loss decreases severity of sleep apnea in most people with obesity. For those with mild obesity who have developed snoring with weight gain, even 15-30 pound weight loss can improve and occasionally eliminate sleep apnea.  Structured and life-long dietary and health habits are necessary to lose weight and keep healthier weight levels.     Though there may be significant health benefits from weight loss, long-term weight loss is very difficult to achieve- studies show success with dietary management in less than 10% of people. In addition, substantial weight loss may require years of dietary control and may be difficult if patients have severe obesity. In these  cases, surgical management may be considered.  Finally, older individuals who have tolerated obesity without health complications may be less likely to benefit from weight loss strategies.        Your BMI is Body mass index is 37.59 kg/m .  Weight management is a personal decision.  If you are interested in exploring weight loss strategies, the following discussion covers the approaches that may be successful. Body mass index (BMI) is one way to tell whether you are at a healthy weight, overweight, or obese. It measures your weight in relation to your height.  A BMI of 18.5 to 24.9 is in the healthy range. A person with a BMI of 25 to 29.9 is considered overweight, and someone with a BMI of 30 or greater is considered obese. More than two-thirds of American adults are considered overweight or obese.  Being overweight or obese increases the risk for further weight gain. Excess weight may lead to heart disease and diabetes.  Creating and following plans for healthy eating and physical activity may help you improve your health.  Weight control is part of healthy lifestyle and includes exercise, emotional health, and healthy eating habits. Careful eating habits lifelong are the mainstay of weight control. Though there are significant health benefits from weight loss, long-term weight loss with diet alone may be very difficult to achieve- studies show long-term success with dietary management in less than 10% of people. Attaining a healthy weight may be especially difficult to achieve in those with severe obesity. In some cases, medications, devices and surgical management might be considered.  What can you do?  If you are overweight or obese and are interested in methods for weight loss, you should discuss this with your provider.     Consider reducing daily calorie intake by 500 calories.     Keep a food journal.     Avoiding skipping meals, consider cutting portions instead.    Diet combined with exercise helps  maintain muscle while optimizing fat loss. Strength training is particularly important for building and maintaining muscle mass. Exercise helps reduce stress, increase energy, and improves fitness. Increasing exercise without diet control, however, may not burn enough calories to loose weight.       Start walking three days a week 10-20 minutes at a time    Work towards walking thirty minutes five days a week     Eventually, increase the speed of your walking for 1-2 minutes at time    In addition, we recommend that you review healthy lifestyles and methods for weight loss available through the National Institutes of Health patient information sites:  http://win.niddk.nih.gov/publications/index.htm    And look into health and wellness programs that may be available through your health insurance provider, employer, local community center, or [employer]ina club.    Weight management plan: Patient was referred to their PCP to discuss a diet and exercise plan.      Surgery:    Surgery for obstructive sleep apnea is considered generally only when other therapies fail to work. Surgery may be discussed with you if you are having a difficult time tolerating CPAP and or when there is an abnormal structure that requires surgical correction.  Nose and throat surgeries often enlarge the airway to prevent collapse.  Most of these surgeries create pain for 1-2 weeks and up to half of the most common surgeries are not effective throughout life.  You should carefully discuss the benefits and drawbacks to surgery with your sleep provider and surgeon to determine if it is the best solution for you.   More information  Surgery for RICO is directed at areas that are responsible for narrowing or complete obstruction of the airway during sleep.  There are a wide range of procedures available to enlarge and/or stabilize the airway to prevent blockage of breathing in the three major areas where it can occur: the palate, tongue, and nasal regions.   Successful surgical treatment depends on the accurate identification of the factors responsible for obstructive sleep apnea in each person.  A personalized approach is required because there is no single treatment that works well for everyone.  Because of anatomic variation, consultation with an examination by a sleep surgeon is a critical first step in determining what surgical options are best for each patient.  In some cases, examination during sedation may be recommended in order to guide the selection of procedures.  Patients will be counseled about risks and benefits as well as the typical recovery course after surgery. Surgery is typically not a cure for a person s RICO.  However, surgery will often significantly improve one s RICO severity (termed  success rate ).  Even in the absence of a cure, surgery will decrease the cardiovascular risk associated with OSA7; improve overall quality of life8 (sleepiness, functionality, sleep quality, etc).      Palate Procedures:  Patients with RICO often have narrowing of their airway in the region of their tonsils and uvula.  The goals of palate procedures are to widen the airway in this region as well as to help the tissues resist collapse.  Modern palate procedure techniques focus on tissue conservation and soft tissue rearrangement, rather than tissue removal.  Often the uvula is preserved in this procedure. Residual sleep apnea is common in patient after pharyngoplasty with an average reduction in sleep apnea events of 33%2.      Tongue Procedures:  ExamWhile patients are awake, the muscles that surround the throat are active and keep this region open for breathing. These muscles relax during sleep, allowing the tongue and other structures to collapse and block breathing.  There are several different tongue procedures available.  Selection of a tongue base procedure depends on characteristics seen on physical exam.  Generally, procedures are aimed at removing bulky  tissues in this area or preventing the back of the tongue from falling back during sleep.  Success rates for tongue surgery range from 50-62%3.    Hypoglossal Nerve Stimulation:  Hypoglossal nerve stimulation has recently received approval from the [place] Food and Drug Administration for the treatment of obstructive sleep apnea.  This is based on research showing that the system was safe and effective in treating sleep apnea6.  Results showed that the median AHI score decreased 68%, from 29.3 to 9.0. This therapy uses an implant system that senses breathing patterns and delivers mild stimulation to airway muscles, which keeps the airway open during sleep.  The system consists of three fully implanted components: a small generator (similar in size to a pacemaker), a breathing sensor, and a stimulation lead.  Using a small handheld remote, a patient turns the therapy on before bed and off upon awakening.    Candidates for this device must be greater than 22 years of age, have moderate to severe RICO (AHI between 20-65), BMI less than 32, have tried CPAP/oral appliance without success, and have appropriate upper airway anatomy (determined by a sleep endoscopy performed by Dr. Marie).    Hypoglossal Nerve Stimulation Pathway:    The sleep surgeon s office will work with the patient through the insurance prior-authorization process (including communications and appeals).    Nasal Procedures:  Nasal obstruction can interfere with nasal breathing during the day and night.  Studies have shown that relief of nasal obstruction can improve the ability of some patients to tolerate positive airway pressure therapy for obstructive sleep apnea1.  Treatment options include medications such as nasal saline, topical corticosteroid and antihistamine sprays, and oral medications such as antihistamines or decongestants. Non-surgical treatments can include external nasal dilators for selected patients. If these are not successful by  themselves, surgery can improve the nasal airway either alone or in combination with these other options.      Combination Procedures:  Combination of surgical procedures and other treatments may be recommended, particularly if patients have more than one area of narrowing or persistent positional disease.  The success rate of combination surgery ranges from 66-80%2,3.    References  1. Raj PINA. The Role of the Nose in Snoring and Obstructive Sleep Apnoea: An Update.  Eur Arch Otorhinolaryngol. 2011; 268: 1365-73.  2.  Laura SM; Macho JA; Fritz JR; Pallanch JF; Kimberlee MB; Nitza SG; Reese COVARRUBIAS. Surgical modifications of the upper airway for obstructive sleep apnea in adults: a systematic review and meta-analysis. SLEEP 2010;33(10):6278-4623. Natalia ALVARADO. Hypopharyngeal surgery in obstructive sleep apnea: an evidence-based medicine review.  Arch Otolaryngol Head Neck Surg. 2006 Feb;132(2):206-13.  3. Errol YH1, Miranda Y, Gerry MELISA. The efficacy of anatomically based multilevel surgery for obstructive sleep apnea. Otolaryngol Head Neck Surg. 2003 Oct;129(4):327-35.  4. Natalia ALVARADO, Goldberg A. Hypopharyngeal Surgery in Obstructive Sleep Apnea: An Evidence-Based Medicine Review. Arch Otolaryngol Head Neck Surg. 2006 Feb;132(2):206-13.  5. Sandra ARAUJO et al. Upper-Airway Stimulation for Obstructive Sleep Apnea.  N Engl J Med. 2014 Jan 9;370(2):139-49.  6. Jayjay Y et al. Increased Incidence of Cardiovascular Disease in Middle-aged Men with Obstructive Sleep Apnea. Am J Respir Crit Care Med; 2002 166: 159-165  7. Stevens EM et al. Studying Life Effects and Effectiveness of Palatopharyngoplasty (SLEEP) study: Subjective Outcomes of Isolated Uvulopalatopharyngoplasty. Otolaryngol Head Neck Surg. 2011; 144: 623-631.    Drive Safe... Drive Alive     Sleep health profoundly affects your health, mood, and your safety.  Thirty three percent of the population (one in three of us) is not getting enough sleep and many have a sleep  disorder. Not getting enough sleep or having an untreated / undertreated sleep condition may make us sleepy without even knowing it. In fact, our driving could be dramatically impaired due to our sleep health. As your provider, here are some things I would like you to know about driving:     Here are some warning signs for impairment and dangerous drowsy driving:              -Having been awake more than 16 hours               -Looking tired               -Eyelid drooping              -Head nodding (it could be too late at this point)              -Driving for more than 30 minutes     Some things you could do to make the driving safer if you are experiencing some drowsiness:              -Stop driving and rest              -Call for transportation              -Make sure your sleep disorder is adequately treated     Some things that have been shown NOT to work when experiencing drowsiness while driving:              -Turning on the radio              -Opening windows              -Eating any  distracting  /  entertaining  foods (e.g., sunflower seeds, candy, or any other)              -Talking on the phone      Your decision may not only impact your life, but also the life of others. Please, remember to drive safe for yourself and all of us.

## 2021-08-05 NOTE — LETTER
8/5/2021         RE: Vandana Crowder  [address]  [address]  [facility] [address]        Dear Colleague,    Thank you for referring your patient, Vandana Crowder, to the [facility]. Please see a copy of my visit note below.    Vandana Crowder is a 61 year old who is being evaluated via a billable video visit.      How would you like to obtain your AVS? MyChart  If the video visit is dropped, the invitation should be resent by: Text to cell phone: [phone] (pt unable to log in via My Chart Link needed via Text)  Will anyone else be joining your video visit? No    Does patient have any form of state insurance?Yes   Do you have wifi? Yes  Do you have a smart phone/device?Yes  Can you download an gabriella on your phone comfortably with out assistance including You Tube? Yes            No Cerna MA      Video-Visit Details    Type of service:  Video Visit    Video Start Time: 9:01 AM    Video End Time:9:40 AM    Originating Location (pt. Location): Other work    Distant Location (provider location):  [facility] home office    Platform used for Video Visit: RosibelUndesk     Chief complaint: Consultation requested by Shawna Pelayo DO for evaluation of snoring and shortness of breath    History of Present Illness: 61-year-old female with history of hypertension, hyperlipidemia, sober from cocaine and heroin, long history of smoking.  She complains of significant weight gain over the last 5 years affecting her breathing.  She states that she gets short of breath changing body positions at night and feels her heart racing.  She typically gets up a couple times to go to the bathroom and is short of breath with walking.  She has been told about coughing and gagging at night by her roommate.  However she is not always aware of the cough.  She has woken herself up with snoring.  She denies waking up with dry mouth, sore throat or headaches.   She denies nocturnal reflux symptoms.  She does have headaches during the day that seem to come out of nowhere.    She occasionally has a sense of restlessness and tossing and turning that affects her ability to fall asleep.  No history of sleepwalking, sleep talking, dream enactment behavior.  She typically gets into bed around 9 and gets out of bed around 6.  She does not take naps during the day because she is too busy but does have sleepiness.  During the day at work if there is some downtime she may close her eyes and rest.  On workdays she may drink 1 to 2 cups of coffee otherwise she does not drink coffee on nonwork days.  She does not use any sleep aids.  She works as a caregiver in a residential setting.  At work she uses the lift chair to go up the stairs because of shortness of breath.  She states she would have no problem with that 5 years ago.  She has been working on quitting smoking but continues to smoke 3 to 4 cigarettes a day.    She denies PND or orthopnea; she does use some pillows to make her shoulder and neck comfortable at night.        [place] Sleepiness Scale   Sitting and reading: High chance of dozing   Watching TV: Moderate chance of dozing   Sitting, inactive in a public place (e.g. a theatre or a meeting): Would never doze   As a passenger in a car for an hour without a break: High chance of dozing   Lying down to rest in the afternoon when circumstances permit: Moderate chance of dozing   Sitting and talking to someone: Slight chance of dozing   Sitting quietly after a lunch without alcohol: Slight chance of dozing   In a car, while stopped for a few minutes in traffic: Would never doze   Total score - [place]: 12     (Less than 10 normal)    Insomnia Severity Scale  ANNALISE Total Score: 10  Total score categories:  0-7 = No clinically significant insomnia   8-14 = Subthreshold insomnia   15-21 = Clinical insomnia (moderate severity)  22-28 = Clinical insomnia (severe)    STOP-BANG  Loud  Snore   0  Excessively Tired/Sleepy   1  Observed apnea   0-1  Hypertension   1  BMI> 35 kg/m2   1  Age >50   1  Neck >16 in/40cm   ?  Male Gender   0  Total =   4-5  (0-2 low, 3-4 intermediate, 5-8 high risk of RICO)      Past Medical History:   Diagnosis Date     Hyperlipidemia      Hypertension        No Known Allergies    Current Outpatient Medications   Medication     albuterol (PROAIR HFA, PROVENTIL HFA, VENTOLIN HFA) 108 (90 BASE) MCG/ACT inhaler     amLODIPine (NORVASC) 10 MG tablet     aspirin (ASA) 81 MG EC tablet     cetirizine (ZYRTEC) 10 MG tablet     nicotine (NICODERM CQ) 14 MG/24HR 24 hr patch     simvastatin (ZOCOR) 20 MG tablet     No current facility-administered medications for this visit.       Social History     Socioeconomic History     Marital status: Single     Spouse name: Not on file     Number of children: Not on file     Years of education: Not on file     Highest education level: Not on file   Occupational History     Not on file   Tobacco Use     Smoking status: Current Every Day Smoker     Types: Cigarettes     Smokeless tobacco: Never Used     Tobacco comment: 4 cigarettes daily   Vaping Use     Vaping Use: Never used   Substance and Sexual Activity     Alcohol use: No     Drug use: No     Sexual activity: Not Currently     Birth control/protection: None   Other Topics Concern     Parent/sibling w/ CABG, MI or angioplasty before 65F 55M? Not Asked   Social History Narrative     Not on file     Social Determinants of Health     Financial Resource Strain:      Difficulty of Paying Living Expenses:    Food Insecurity:      Worried About Running Out of Food in the Last Year:      Ran Out of Food in the Last Year:    Transportation Needs:      Lack of Transportation (Medical):      Lack of Transportation (Non-Medical):    Physical Activity:      Days of Exercise per Week:      Minutes of Exercise per Session:    Stress:      Feeling of Stress :    Social Connections:      Frequency of  "Communication with Friends and Family:      Frequency of Social Gatherings with Friends and Family:      Attends Anglican Services:      Active Member of Clubs or Organizations:      Attends Club or Organization Meetings:      Marital Status:    Intimate Partner Violence:      Fear of Current or Ex-Partner:      Emotionally Abused:      Physically Abused:      Sexually Abused:        No family history on file.    Review of Systems: Positve for HPI, otherwise comprehensive review of systems is negative.    EXAM:  Ht 1.613 m (5' 3.5\")   Wt 97.8 kg (215 lb 9.6 oz)   LMP  (LMP Unknown)   BMI 37.59 kg/m    GENERAL: Alert and no distress  EYES: Eyes grossly normal to inspection.  No discharge or erythema, or obvious scleral/conjunctival abnormalities.  RESP: No audible wheeze, cough, or visible cyanosis.  No visible retractions or increased work of breathing.    SKIN: Visible skin clear. No significant rash, abnormal pigmentation or lesions.  NEURO: Cranial nerves grossly intact.  Mentation and speech appropriate for age.  PSYCH: Mentation appears normal, affect normal, judgement and insight intact, normal speech and appearance well-groomed.       EXAM: CT CHEST PULMONARY ANGIO 5/6/2019 11:06 PM. ([facility])  COMPARISON: None.   CLINICAL DATA: Chest pain, acute, PE suspected, intermed prob, positive D-dimer   TECHNIQUE: A CT angiogram (CTA) of the pulmonary arteries and chest was performed.  Specifically, preliminary unenhanced images were obtained through the pulmonary arteries.  Following this, during bolus infusion of intravenous contrast, thin-section contiguous transaxial images were obtained through the thorax.  In addition, multiplanar and three dimensional (3D) reformations through the pulmonary arterial tree were generated from the acquisition scanner and reviewed. A total of 100 cc of Omnipaque 350 was administered intravenously for this study.   FINDINGS:   Pulmonary Arteries: No filling defects " are identified in the main, right and left pulmonary arteries. No filling defects are visible within the lobar or peripheral arteries.     Lungs: Minimal dependent density in both lower lobes. Paraseptal and centrilobular emphysema most conspicuous in the upper lobes.  No mass or consolidation.   Mediastinum:  No enlarged lymph nodes.  No pericardial effusion.  Small hiatal hernia.   Pleura:  No pleural effusions.  No pleural gas.   Included upper abdomen has normal appearance.    Free Thyroxine 1.12 3/23/2019    ASSESSMENT:  61-year-old female with history of hypertension, asthma, obesity, smoking with nocturnal cough and gas seen, snoring, daytime sleepiness and dyspnea with minimal activity.  She is at risk for sleep disordered breathing.  She has abnormal imaging with CT scanning showing emphysema and is at also risk for COPD.    PLAN:  Patient is encouraged to continue her efforts at smoking cessation.  She is also encouraged to stay as active as possible with walking on flat surfaces.  Recommended comprehensive pulmonary function testing to better evaluate asthma/COPD risk.  And recommended in lab polysomnography for accurate evaluation of sleep disordered breathing.  We reviewed the potential benefits of treatment of sleep disordered breathing including resolution of daytime sleepiness and improved breathing at night, decrease cardiovascular risk.  Patient is familiar with CPAP and would be open to a trial of CPAP if indicated.  Is unable to find any thyroid function testing since 2019.  This could be reassessed.    63 minutes spent on the date of the encounter doing chart review, history and exam, documentation and further activities per the note    Clemencia Ayala M.D.  Pulmonary/Critical Care/Sleep Medicine    [facility]   [address], [address]   [address] [place] [address]   Appointments: [phone]    The above note was  dictated using voice recognition software and may include typographical errors. Please contact the author for any clarifications.                    Again, thank you for allowing me to participate in the care of your patient.        Sincerely,        Clemencia Ayala MD

## 2021-08-11 ENCOUNTER — PRE VISIT (OUTPATIENT)
Dept: OTOLARYNGOLOGY | Facility: CLINIC | Age: 61
End: 2021-08-11

## 2021-08-11 ENCOUNTER — OFFICE VISIT (OUTPATIENT)
Dept: OTOLARYNGOLOGY | Facility: CLINIC | Age: 61
End: 2021-08-11
Attending: FAMILY MEDICINE
Payer: COMMERCIAL

## 2021-08-11 VITALS — WEIGHT: 212 LBS | HEIGHT: 67 IN | BODY MASS INDEX: 33.27 KG/M2

## 2021-08-11 DIAGNOSIS — K12.2 SUBMANDIBULAR ABSCESS: Primary | ICD-10-CM

## 2021-08-11 PROCEDURE — 31575 DIAGNOSTIC LARYNGOSCOPY: CPT | Performed by: OTOLARYNGOLOGY

## 2021-08-11 PROCEDURE — 99204 OFFICE O/P NEW MOD 45 MIN: CPT | Mod: 25 | Performed by: OTOLARYNGOLOGY

## 2021-08-11 ASSESSMENT — MIFFLIN-ST. JEOR: SCORE: 1554.26

## 2021-08-11 ASSESSMENT — PAIN SCALES - GENERAL: PAINLEVEL: NO PAIN (0)

## 2021-08-11 NOTE — PROGRESS NOTES
Dear Dr. Corrales:    I had the pleasure of meeting Vandana Crowder in consultation today at the [facility] at your request.     History of Present Illness:   Vandana Crowder is a 61 year old woman referred for evaluation of dysphagia. She reportedly has a history of a right submandibular gland excision in 2014 by Dr Bravo. This was done due to severe sialadenitis. She reports that food will get stuck in the area where the gland was removed. She tried to return to the ENT who did the procedure but could not afford the co-pay.    She says that when she eats she can push food out from under her tongue. She says that it happens every time she eats. She says a pouch is forming under her mandible. She has no pain. She says the food she pushes up are things that she just eats. She does not notice with rinsing her mouth out during the day that food is collecting. She does have issues with food collecting around her dentures. She denies any neck neck swelling with this. She has baseline right facial numbness since a previous mandible fracture.    She has not had any CT scans of the neck.    She does not some voice changes/deepending of her voice.      Past medical history: hypertension, hyperlipidemia, hepatitis C (treated)    Past surgical history: submandibular gland excision, mandibular reconstruction for mandible fracture (2007 or 2008)    Social history: History of cocaine and heroin use (sober). Smoker up to 1.5 ppd, trying to quit, down to 4-5 cigarettes per day, smoking since age 15. No chewing tobacco use. Drinks alcohol daily (1-2 glasses).    Family history: No history of H&N cancer    MEDICATIONS:     Current Outpatient Medications   Medication Sig Dispense Refill     albuterol (PROAIR HFA, PROVENTIL HFA, VENTOLIN HFA) 108 (90 BASE) MCG/ACT inhaler Inhale 2 puffs into the lungs every 6 hours       amLODIPine (NORVASC) 10 MG tablet Take 10 mg by mouth daily       aspirin  (ASA) 81 MG EC tablet Take 81 mg by mouth daily       nicotine (NICODERM CQ) 14 MG/24HR 24 hr patch Place 1 patch onto the skin       simvastatin (ZOCOR) 20 MG tablet Take 20 mg by mouth daily       cetirizine (ZYRTEC) 10 MG tablet Take 10 mg by mouth as needed (Patient not taking: Reported on 8/11/2021)         ALLERGIES:    Allergies   Allergen Reactions     Latex Itching and Swelling       HABITS/SOCIAL HISTORY:   History of cocaine and heroin use (sober). Smoker up to 1.5 ppd, trying to quit, down to 4-5 cigarettes per day, smoking since age 15. No chewing tobacco use. Drinks alcohol daily (1-2 glasses).    Social History     Socioeconomic History     Marital status: Single     Spouse name: Not on file     Number of children: Not on file     Years of education: Not on file     Highest education level: Not on file   Occupational History     Not on file   Tobacco Use     Smoking status: Current Every Day Smoker     Types: Cigarettes     Start date: 8/5/2021     Smokeless tobacco: Never Used     Tobacco comment: 3-4 cigarettes daily   Vaping Use     Vaping Use: Never used   Substance and Sexual Activity     Alcohol use: Yes     Alcohol/week: 7.0 standard drinks     Types: 7 Shots of liquor per week     Comment: 2 oz Vodka every evening     Drug use: Not Currently     Types: Cocaine, Opiates     Comment: sober since 2009     Sexual activity: Not Currently     Birth control/protection: None   Other Topics Concern     Parent/sibling w/ CABG, MI or angioplasty before 65F 55M? Not Asked   Social History Narrative     Not on file     Social Determinants of Health     Financial Resource Strain:      Difficulty of Paying Living Expenses:    Food Insecurity:      Worried About Running Out of Food in the Last Year:      Ran Out of Food in the Last Year:    Transportation Needs:      Lack of Transportation (Medical):      Lack of Transportation (Non-Medical):    Physical Activity:      Days of Exercise per Week:      Minutes  "of Exercise per Session:    Stress:      Feeling of Stress :    Social Connections:      Frequency of Communication with Friends and Family:      Frequency of Social Gatherings with Friends and Family:      Attends Adventism Services:      Active Member of Clubs or Organizations:      Attends Club or Organization Meetings:      Marital Status:    Intimate Partner Violence:      Fear of Current or Ex-Partner:      Emotionally Abused:      Physically Abused:      Sexually Abused:        PAST MEDICAL HISTORY:   Past Medical History:   Diagnosis Date     Hyperlipidemia      Hypertension         PAST SURGICAL HISTORY: No past surgical history on file.    FAMILY HISTORY:    Family History   Problem Relation Age of Onset     Throat cancer Father      Other - See Comments Sister         gunshot to head lead to long term brain injury     Snoring Brother      Brain Hemorrhage Brother         aneurysm     Dementia Brother        REVIEW OF SYSTEMS:  12 point ROS was negative other than the symptoms noted above in the HPI.  Patient Supplied Answers to Review of Systems  No flowsheet data found.      PHYSICAL EXAMINATION:   Ht 1.702 m (5' 7\")   Wt 96.2 kg (212 lb)   LMP  (LMP Unknown)   BMI 33.20 kg/m     Appearance:   normal; NAD, age-appropriate appearance, well-developed, normal habitus   Communication:   normal; communicates verbally, normal voice quality   Head/Face:   inspection -  Normal; no scars or visible lesions   Salivary glands -  Normal size, no tenderness, swelling, or palpable masses; s/p right submandibular gland excision   Facial strength -  Normal and symmetric bilateral; H/B I/VI   Skin:  normal, no rash   Ears:  auricle (AD) -  normal  EAC (AD) -  normal  TM (AD) -  Normal, no effusion  auricle (AS) -  normal  EAC (AS) -  normal  TM (AS) -  Normal, no effusion  Normal clinical speech reception   Nose:  Ext. inspection -  Normal  Internal Inspection -  Normal mucosa, septum, and turbinates   Nasopharynx:  " normal mucosa, no masses   Oral Cavity:  lips -  Normal mucosa, oral competence, and stoma size   Dentures, edentulous, healthy gingival mucosa   Hard palate, buccal, floor of mouth mucosa normal   Along the right posterior FOM near the RMT there is a concavity or pocket that has formed which allows collection of food debris when patient was given cookie to eat in clinic. Normal mucosa within this area, no concerning masses, does not appear to communicate with neck.    Tongue - normal movement, no lesions   Oropharynx:  mucosa -  Normal, no lesions  soft palate -  Normal, no lesions, no asymmetry, normal elevation  BOT - normal  Vallecula - clear   Hypopharynx:  Normal pyriform sinus and pharyngeal wall mucosa   No pooled secretions    Larynx:  Epiglottis, AE folds, arytenoids normal in appearance, bilaterally mobile cords   There is Krystal's edema and polypoid changes of the true cords bilaterally along the anterior 1/2 of the cords with involvement of the anterior commissure, does not occlude airway   Neck: Well healed right neck incision s/p submandibular gland excision  No palpable masses   Lymphatic:  no abnormal nodes   Cardiovascular: warm, pink, well-perfused extremities without swelling, tenderness, or edema   Respiratory: Normal respiratory effort, no stridor   Neuro/Psych.:  mood/affect -  normal  mental status -  normal       PROCEDURES:   Flexible fiberoptic laryngoscopy: Scope exam was indicated due to voice changes. Verbal consent was obtained. The nasal cavity was prepped with an aerosolized solution of topical anesthetic and vasoconstrictive agent. The scope was passed through the anterior nasal cavity and advanced. Inspection of the nasopharynx revealed no gross abnormality. The base of tongue and vallecula are normal. The epiglottis, AE folds, arytenoids are normal. Inspection of the larynx revealed bilaterally mobile vocal cords. There is Krystal's edema and polypoid changes to the bilateral true  cords involving about the anterior 1/2 of the cords and anterior commissure. Pyriform sinuses are symmetric. The airway is patent. Procedure tolerated well with no immediate complications noted.          RESULTS REVIEWED:   I reviewed the notes from PCP, operative note from Dr Bravo      IMPRESSION AND PLAN:   Vandana Crowder is a 61 year old woman referred for evaluation of complaints of food debris collecting with eating since undergoing right submandibular gland excision. I had her eat some cookies in clinic and found that there is a pocket that is completely mucosalized along the right posterior floor of mouth near the RMT that collects the food. The area was carefully suctioned without any mucosal abnormalities, no communication to the neck. I discussed with her oral hygiene to keep this area clean including use of baking soda and salt water rinses and she was given some syringes to try to use to flush the area out at least 2 times per day. I would not recommend any intervention on this.    She did note voice changes and on scope exam has Krystal's edema and polypoid changes to the true cords involving the anterior true cords. She was counseled to continue to work on smoking cessation. A referral was placed for laryngology evaluation. She will likely need to see the voice therapy team as well.    I will see her back as needed.    Thank you very much for the opportunity to participate in the care of your patient.      Esperanza Sherman MD, M.D.  Otolaryngology- Head & Neck Surgery      This note was dictated with voice recognition software and then edited. Please excuse any unintentional errors.         CC:  Cesar Corrales MD  [address] [facility] [address]

## 2021-08-11 NOTE — PROGRESS NOTES
"    Assessment & Plan     Persistent asthma without complication, unspecified asthma severity  Concern for COPD  Personal Hx of Tobacco Use  She has abnormal imaging with CT scanning showing emphysema and is at also risk for COPD (smoking hx). She reports known history of asthma going back to childhood. Unclear diagnosis, will get PFTs to assist (asthma + COPD?). Will treat with LAMA for now as high suspicion for COPD component (discussed patient care with clinical pharmacist). Will get eosinophil count as well if > 300 would favor adding on a ICS to this combination. Pending her PFTs and insurance converge may benefit from triple therapy LABA + LAMA + ICS as her disease appears  uncontrolled. Discussed controlling environmental triggers and smoking cessation. Vaccines: Pt accepted PPSV23.  Pt will be completing sleep study with pulmonology (reviewed documentation from their visit, also had concern about COPD).   - General PFT Lab (Please always keep checked)  - Pulmonary Function Test  - tiotropium (SPIRIVA RESPIMAT) 2.5 MCG/ACT inhaler  Dispense: 4 g; Refill: 1  - Pneumococcal vaccine 23 valent PPSV23  (Pneumovax) [[id number]]  - CBC with Platelets & Differential  - Awaiting to do sleep study     Depression, unspecified depression type  Mood improving, new job with [employer]. Still interested in therapy. Pt does not have access to [facility] [employer], is going to call tomorrow to sort it out. In the meantime, gave Dr. Trimble's suggestions via AVS (printed for patient).     Chronic idiopathic constipation  Discussed lifestyle changes. Use something to treat constipation daily, as prescribed.  Eat fruits, especially the \"P\" fruits (peaches, pears, prunes, etc) to help keep stools soft. Increase vegetables in the diet.  Switch from white breads and rice to brown breads and rice to increase fiber. Use the \"gastrocolic reflex\" to help you have a bowel movement - sit on the toilet after eating, your body naturally sends a message " "to have a bowel movement after eating. Try not to push. Come back to see us if not improved.   - polyethylene glycol (MIRALAX) 17 GM/Dose powder  Dispense: 850 g; Refill: 1  - SENNA-docusate sodium (SENNA S) 8.6-50 MG tablet  Dispense: 30 tablet; Refill: 1    Encounter for immunization  - TDAP VACCINE (Adacel, Boostrix)  [[id number]]    Healthcare maintenance  Still has to schedule mammogram, re gave phone number. Colonoscopy scheduled 8/30.   Reviewed all previous labs with this patient from this visit.     H/o submandibular gland removal  Reviewed recent note by ENT. ENT noticed pocket that is completely mucosalized along the right posterior floor of mouth near the RMT that collects the food. No acute surgical intervention, gave maintenance and care advice for the area. Referred to laryngoscopy due to brunilda's edema and polyps on vocal cords. Advised to quit smoking. Reviewed advice from that visit with patient and ensured she understood proper follow-up.       Review of external notes as documented elsewhere in note  Ordering of each unique test    Return in about 4 weeks (around 9/9/2021).    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 mood   Uses own car   Living - roommate   Sleep - goes to sleep okay, hard to fall back to sleep   Involved in community, back pack give aways - go to [employer]    Part of club that does all this, needs them done by next week, has help     Today's PHQ-9 Score:   PHQ-9 SCORE 8/12/2021   PHQ-9 Total Score 3          VALENTINA Score:  VALENTINA-7 SCORE 7/28/2021   Total Score 7       COPD / Asthma  Hx of CT showing " emphysema  Smoker  High risk for COPD  No PFTs on file     PPSV23  Tdap  Smoking: same amount 3-4  a day, wearing patch, taking it off to smoke afternoon     Chronic cough - definitely has a cough, comes and goes    Recurrent wheeze - yes sometimes  Dyspnea   Works with exercise - is worse with walking  Sputum - yep   Diagnosed with asthma as kids   Has advair hates the powder like stuff   Albuterol does help                                                      Asthma Control Test score for today:   ACT Total Scores 8/12/2021   ACT TOTAL SCORE (Goal Greater than or Equal to 20) 21   In the past 12 months, how many times did you visit the emergency room for your asthma without being admitted to the hospital? 0   In the past 12 months, how many times were you hospitalized overnight because of your asthma? 0     Asthma Action Plan done this year?: NO   Will fill out appropriate plan when I understand if this is asthma vs. Copd vs. combined       Review of Systems   Constitutional, HEENT, cardiovascular, pulmonary, gi and gu systems are negative, except as otherwise noted.      Objective    /83   Pulse 85   Temp 98.4  F (36.9  C) (Oral)   Resp 16   Wt 96.2 kg (212 lb)   LMP  (LMP Unknown)   SpO2 96%   BMI 33.20 kg/m    Body mass index is 33.2 kg/m .  Physical Exam   General: Alert and oriented, in no acute distress.  Skin: Warm and dry, no abnormalities noted.  Eyes: Extra-ocular muscles grossly intact, pupils equal.  ENT: Speech intact, nasal passages open, no hearing impairment noted.  CV: S1 S2 RRR. No murmur.  No cyanosis or pallor, warm and well perfused.  Respiratory: No respiratory distress, no accessory muscle use.  Abdomen: Soft, distended, nontender, NA BS x1 quad. Ventral hernia, reducible. No HSM. No rebound or guarding.   Psychiatric: Mood and affect appear normal.   Extremities: Warm, able to move all four extremities at will.      Office Visit on 07/28/2021   Component Date Value Ref Range  Status     Interpretation 07/28/2021 Negative for Intraepithelial Lesion or Malignancy (NILM)    Final     Specimen Adequacy 07/28/2021 Satisfactory for evaluation, endocervical/transformation zone component absent   Final     Clinical Information 07/28/2021    Final                    Value:This result contains rich text formatting which cannot be displayed here.     HPV Reflex 07/28/2021 Yes regardless of result   Final     Previous Abnormal? 07/28/2021    Final                    Value:This result contains rich text formatting which cannot be displayed here.     Performing Labs 07/28/2021    Final                    Value:This result contains rich text formatting which cannot be displayed here.     Hepatitis C RNA IU/mL 07/28/2021 Not Detected  Not Detected IU/mL Final     HIV Antigen Antibody Combo 07/28/2021 Nonreactive  Nonreactive Final    HIV-1 p24 Ag & HIV-1/HIV-2 Ab Not Detected     Sodium 07/28/2021 138  133 - 144 mmol/L Final     Potassium 07/28/2021 4.9  3.4 - 5.3 mmol/L Final     Chloride 07/28/2021 108  94 - 109 mmol/L Final     Carbon Dioxide (CO2) 07/28/2021 23  20 - 32 mmol/L Final     Anion Gap 07/28/2021 7  3 - 14 mmol/L Final     Urea Nitrogen 07/28/2021 13  7 - 30 mg/dL Final     Creatinine 07/28/2021 0.71  0.52 - 1.04 mg/dL Final     Calcium 07/28/2021 9.2  8.5 - 10.1 mg/dL Final     Glucose 07/28/2021 86  70 - 99 mg/dL Final     Alkaline Phosphatase 07/28/2021 69  40 - 150 U/L Final     AST 07/28/2021 16  0 - 45 U/L Final     ALT 07/28/2021 20  0 - 50 U/L Final     Protein Total 07/28/2021 7.8  6.8 - 8.8 g/dL Final     Albumin 07/28/2021 3.8  3.4 - 5.0 g/dL Final     Bilirubin Total 07/28/2021 0.3  0.2 - 1.3 mg/dL Final     GFR Estimate 07/28/2021 >90  >60 mL/min/1.73m2 Final    As of July 11, 2021, eGFR is calculated by the CKD-EPI creatinine equation, without race adjustment. eGFR can be influenced by muscle mass, exercise, and diet. The reported eGFR is an estimation only and is only  applicable if the renal function is stable.     Total Protein Random Urine g/L 07/28/2021 0.17  g/L Final    The reference range has not been established for total protein in random urine samples.  The result should be integrated into the clinical context for interpretation.     Total Protein Urine g/gr Creatinine 07/28/2021 0.19  0.00 - 0.20 g/g Cr Final     Creatinine Urine mg/dL 07/28/2021 89  mg/dL Final     Other HR HPV 07/28/2021 Negative  Negative Final     HPV16 DNA 07/28/2021 Negative  Negative Final     HPV18 DNA 07/28/2021 Negative  Negative Final     FINAL DIAGNOSIS 07/28/2021    Final                    Value:This result contains rich text formatting which cannot be displayed here.       Reviewed CT personally.   EXAM: CT CHEST PULMONARY ANGIO 5/6/2019 11:06 PM. ([facility])  COMPARISON: None.   CLINICAL DATA: Chest pain, acute, PE suspected, intermed prob, positive D-dimer   TECHNIQUE: A CT angiogram (CTA) of the pulmonary arteries and chest was performed.  Specifically, preliminary unenhanced images were obtained through the pulmonary arteries.  Following this, during bolus infusion of intravenous contrast, thin-section contiguous transaxial images were obtained through the thorax.  In addition, multiplanar and three dimensional (3D) reformations through the pulmonary arterial tree were generated from the acquisition scanner and reviewed. A total of 100 cc of Omnipaque 350 was administered intravenously for this study.   FINDINGS:   Pulmonary Arteries: No filling defects are identified in the main, right and left pulmonary arteries. No filling defects are visible within the lobar or peripheral arteries.     Lungs: Minimal dependent density in both lower lobes. Paraseptal and centrilobular emphysema most conspicuous in the upper lobes.  No mass or consolidation.   Mediastinum:  No enlarged lymph nodes.  No pericardial effusion.  Small hiatal hernia.   Pleura:  No pleural effusions.  No  pleural gas.   Included upper abdomen has normal appearance.

## 2021-08-11 NOTE — LETTER
8/11/2021       RE: Vandana Crowder  [address]  [address]  [place] [address]     Dear Colleague,    Thank you for referring your patient, Vandana Crowder, to the [facility] [place] at [facility]. Please see a copy of my visit note below.    Dear Dr. Corrales:    I had the pleasure of meeting Vandana Crowder in consultation today at the [facility] at your request.     History of Present Illness:   Vandana Crowder is a 61 year old woman referred for evaluation of dysphagia. She reportedly has a history of a right submandibular gland excision in 2014 by Dr Bravo. This was done due to severe sialadenitis. She reports that food will get stuck in the area where the gland was removed. She tried to return to the ENT who did the procedure but could not afford the co-pay.    She says that when she eats she can push food out from under her tongue. She says that it happens every time she eats. She says a pouch is forming under her mandible. She has no pain. She says the food she pushes up are things that she just eats. She does not notice with rinsing her mouth out during the day that food is collecting. She does have issues with food collecting around her dentures. She denies any neck neck swelling with this. She has baseline right facial numbness since a previous mandible fracture.    She has not had any CT scans of the neck.    She does not some voice changes/deepending of her voice.      Past medical history: hypertension, hyperlipidemia, hepatitis C (treated)    Past surgical history: submandibular gland excision, mandibular reconstruction for mandible fracture (2007 or 2008)    Social history: History of cocaine and heroin use (sober). Smoker up to 1.5 ppd, trying to quit, down to 4-5 cigarettes per day, smoking since age 15. No chewing tobacco use. Drinks alcohol daily (1-2  glasses).    Family history: No history of H&N cancer    MEDICATIONS:     Current Outpatient Medications   Medication Sig Dispense Refill     albuterol (PROAIR HFA, PROVENTIL HFA, VENTOLIN HFA) 108 (90 BASE) MCG/ACT inhaler Inhale 2 puffs into the lungs every 6 hours       amLODIPine (NORVASC) 10 MG tablet Take 10 mg by mouth daily       aspirin (ASA) 81 MG EC tablet Take 81 mg by mouth daily       nicotine (NICODERM CQ) 14 MG/24HR 24 hr patch Place 1 patch onto the skin       simvastatin (ZOCOR) 20 MG tablet Take 20 mg by mouth daily       cetirizine (ZYRTEC) 10 MG tablet Take 10 mg by mouth as needed (Patient not taking: Reported on 8/11/2021)         ALLERGIES:    Allergies   Allergen Reactions     Latex Itching and Swelling       HABITS/SOCIAL HISTORY:   History of cocaine and heroin use (sober). Smoker up to 1.5 ppd, trying to quit, down to 4-5 cigarettes per day, smoking since age 15. No chewing tobacco use. Drinks alcohol daily (1-2 glasses).    Social History     Socioeconomic History     Marital status: Single     Spouse name: Not on file     Number of children: Not on file     Years of education: Not on file     Highest education level: Not on file   Occupational History     Not on file   Tobacco Use     Smoking status: Current Every Day Smoker     Types: Cigarettes     Start date: 8/5/2021     Smokeless tobacco: Never Used     Tobacco comment: 3-4 cigarettes daily   Vaping Use     Vaping Use: Never used   Substance and Sexual Activity     Alcohol use: Yes     Alcohol/week: 7.0 standard drinks     Types: 7 Shots of liquor per week     Comment: 2 oz Vodka every evening     Drug use: Not Currently     Types: Cocaine, Opiates     Comment: sober since 2009     Sexual activity: Not Currently     Birth control/protection: None   Other Topics Concern     Parent/sibling w/ CABG, MI or angioplasty before 65F 55M? Not Asked   Social History Narrative     Not on file     Social Determinants of Health     Financial  "Resource Strain:      Difficulty of Paying Living Expenses:    Food Insecurity:      Worried About Running Out of Food in the Last Year:      Ran Out of Food in the Last Year:    Transportation Needs:      Lack of Transportation (Medical):      Lack of Transportation (Non-Medical):    Physical Activity:      Days of Exercise per Week:      Minutes of Exercise per Session:    Stress:      Feeling of Stress :    Social Connections:      Frequency of Communication with Friends and Family:      Frequency of Social Gatherings with Friends and Family:      Attends Nondenominational Services:      Active Member of Clubs or Organizations:      Attends Club or Organization Meetings:      Marital Status:    Intimate Partner Violence:      Fear of Current or Ex-Partner:      Emotionally Abused:      Physically Abused:      Sexually Abused:        PAST MEDICAL HISTORY:   Past Medical History:   Diagnosis Date     Hyperlipidemia      Hypertension         PAST SURGICAL HISTORY: No past surgical history on file.    FAMILY HISTORY:    Family History   Problem Relation Age of Onset     Throat cancer Father      Other - See Comments Sister         gunshot to head lead to long term brain injury     Snoring Brother      Brain Hemorrhage Brother         aneurysm     Dementia Brother        REVIEW OF SYSTEMS:  12 point ROS was negative other than the symptoms noted above in the HPI.  Patient Supplied Answers to Review of Systems  No flowsheet data found.      PHYSICAL EXAMINATION:   Ht 1.702 m (5' 7\")   Wt 96.2 kg (212 lb)   LMP  (LMP Unknown)   BMI 33.20 kg/m     Appearance:   normal; NAD, age-appropriate appearance, well-developed, normal habitus   Communication:   normal; communicates verbally, normal voice quality   Head/Face:   inspection -  Normal; no scars or visible lesions   Salivary glands -  Normal size, no tenderness, swelling, or palpable masses; s/p right submandibular gland excision   Facial strength -  Normal and symmetric " bilateral; H/B I/VI   Skin:  normal, no rash   Ears:  auricle (AD) -  normal  EAC (AD) -  normal  TM (AD) -  Normal, no effusion  auricle (AS) -  normal  EAC (AS) -  normal  TM (AS) -  Normal, no effusion  Normal clinical speech reception   Nose:  Ext. inspection -  Normal  Internal Inspection -  Normal mucosa, septum, and turbinates   Nasopharynx:  normal mucosa, no masses   Oral Cavity:  lips -  Normal mucosa, oral competence, and stoma size   Dentures, edentulous, healthy gingival mucosa   Hard palate, buccal, floor of mouth mucosa normal   Along the right posterior FOM near the RMT there is a concavity or pocket that has formed which allows collection of food debris when patient was given cookie to eat in clinic. Normal mucosa within this area, no concerning masses, does not appear to communicate with neck.    Tongue - normal movement, no lesions   Oropharynx:  mucosa -  Normal, no lesions  soft palate -  Normal, no lesions, no asymmetry, normal elevation  BOT - normal  Vallecula - clear   Hypopharynx:  Normal pyriform sinus and pharyngeal wall mucosa   No pooled secretions    Larynx:  Epiglottis, AE folds, arytenoids normal in appearance, bilaterally mobile cords   There is Krystal's edema and polypoid changes of the true cords bilaterally along the anterior 1/2 of the cords with involvement of the anterior commissure, does not occlude airway   Neck: Well healed right neck incision s/p submandibular gland excision  No palpable masses   Lymphatic:  no abnormal nodes   Cardiovascular: warm, pink, well-perfused extremities without swelling, tenderness, or edema   Respiratory: Normal respiratory effort, no stridor   Neuro/Psych.:  mood/affect -  normal  mental status -  normal       PROCEDURES:   Flexible fiberoptic laryngoscopy: Scope exam was indicated due to voice changes. Verbal consent was obtained. The nasal cavity was prepped with an aerosolized solution of topical anesthetic and vasoconstrictive agent. The  scope was passed through the anterior nasal cavity and advanced. Inspection of the nasopharynx revealed no gross abnormality. The base of tongue and vallecula are normal. The epiglottis, AE folds, arytenoids are normal. Inspection of the larynx revealed bilaterally mobile vocal cords. There is Krystal's edema and polypoid changes to the bilateral true cords involving about the anterior 1/2 of the cords and anterior commissure. Pyriform sinuses are symmetric. The airway is patent. Procedure tolerated well with no immediate complications noted.          RESULTS REVIEWED:   I reviewed the notes from PCP, operative note from Dr Bravo      IMPRESSION AND PLAN:   Vandana Crowder is a 61 year old woman referred for evaluation of complaints of food debris collecting with eating since undergoing right submandibular gland excision. I had her eat some cookies in clinic and found that there is a pocket that is completely mucosalized along the right posterior floor of mouth near the RMT that collects the food. The area was carefully suctioned without any mucosal abnormalities, no communication to the neck. I discussed with her oral hygiene to keep this area clean including use of baking soda and salt water rinses and she was given some syringes to try to use to flush the area out at least 2 times per day. I would not recommend any intervention on this.    She did note voice changes and on scope exam has Krystal's edema and polypoid changes to the true cords involving the anterior true cords. She was counseled to continue to work on smoking cessation. A referral was placed for laryngology evaluation. She will likely need to see the voice therapy team as well.    I will see her back as needed.    Thank you very much for the opportunity to participate in the care of your patient.      Esperanza Sherman MD, M.D.  Otolaryngology- Head & Neck Surgery      This note was dictated with voice recognition software and then edited. Please  excuse any unintentional errors.       CC:  Cesar Corrales MD  [address] [facility] [id number]

## 2021-08-12 ENCOUNTER — OFFICE VISIT (OUTPATIENT)
Dept: FAMILY MEDICINE | Facility: CLINIC | Age: 61
End: 2021-08-12
Payer: COMMERCIAL

## 2021-08-12 ENCOUNTER — TELEPHONE (OUTPATIENT)
Dept: GASTROENTEROLOGY | Facility: CLINIC | Age: 61
End: 2021-08-12

## 2021-08-12 VITALS
DIASTOLIC BLOOD PRESSURE: 83 MMHG | RESPIRATION RATE: 16 BRPM | HEART RATE: 85 BPM | BODY MASS INDEX: 33.2 KG/M2 | WEIGHT: 212 LBS | TEMPERATURE: 98.4 F | OXYGEN SATURATION: 96 % | SYSTOLIC BLOOD PRESSURE: 128 MMHG

## 2021-08-12 DIAGNOSIS — Z90.89 H/O SUBMANDIBULAR GLAND REMOVAL: ICD-10-CM

## 2021-08-12 DIAGNOSIS — K59.04 CHRONIC IDIOPATHIC CONSTIPATION: ICD-10-CM

## 2021-08-12 DIAGNOSIS — Z87.891 PERSONAL HISTORY OF TOBACCO USE: ICD-10-CM

## 2021-08-12 DIAGNOSIS — Z98.890 H/O SUBMANDIBULAR GLAND REMOVAL: ICD-10-CM

## 2021-08-12 DIAGNOSIS — J45.909 PERSISTENT ASTHMA WITHOUT COMPLICATION, UNSPECIFIED ASTHMA SEVERITY: Primary | ICD-10-CM

## 2021-08-12 DIAGNOSIS — Z11.59 ENCOUNTER FOR SCREENING FOR OTHER VIRAL DISEASES: ICD-10-CM

## 2021-08-12 DIAGNOSIS — J38.1 REINKE'S EDEMA OF VOCAL FOLDS: ICD-10-CM

## 2021-08-12 DIAGNOSIS — F32.A DEPRESSION, UNSPECIFIED DEPRESSION TYPE: ICD-10-CM

## 2021-08-12 DIAGNOSIS — Z23 ENCOUNTER FOR IMMUNIZATION: ICD-10-CM

## 2021-08-12 DIAGNOSIS — Z00.00 HEALTH CARE MAINTENANCE: ICD-10-CM

## 2021-08-12 LAB
BASOPHILS # BLD AUTO: 0 10E3/UL (ref 0–0.2)
BASOPHILS NFR BLD AUTO: 0 %
EOSINOPHIL # BLD AUTO: 0.2 10E3/UL (ref 0–0.7)
EOSINOPHIL NFR BLD AUTO: 2 %
ERYTHROCYTE [DISTWIDTH] IN BLOOD BY AUTOMATED COUNT: 13.3 % (ref 10–15)
HCT VFR BLD AUTO: 39.4 %
HGB BLD-MCNC: 12.6 G/DL
IMM GRANULOCYTES # BLD: 0 10E3/UL
IMM GRANULOCYTES NFR BLD: 0 %
LYMPHOCYTES # BLD AUTO: 3 10E3/UL (ref 0.8–5.3)
LYMPHOCYTES NFR BLD AUTO: 41 %
MCH RBC QN AUTO: 29.9 PG (ref 26.5–33)
MCHC RBC AUTO-ENTMCNC: 32 G/DL (ref 31.5–36.5)
MCV RBC AUTO: 94 FL (ref 78–100)
MONOCYTES # BLD AUTO: 0.8 10E3/UL (ref 0–1.3)
MONOCYTES NFR BLD AUTO: 11 %
NEUTROPHILS # BLD AUTO: 3.4 10E3/UL (ref 1.6–8.3)
NEUTROPHILS NFR BLD AUTO: 46 %
PLATELET # BLD AUTO: 228 10E3/UL (ref 150–450)
RBC # BLD AUTO: 4.21 10E6/UL
WBC # BLD AUTO: 7.4 10E3/UL (ref 4–11)

## 2021-08-12 PROCEDURE — 85025 COMPLETE CBC W/AUTO DIFF WBC: CPT | Performed by: STUDENT IN AN ORGANIZED HEALTH CARE EDUCATION/TRAINING PROGRAM

## 2021-08-12 PROCEDURE — 90471 IMMUNIZATION ADMIN: CPT | Mod: 59 | Performed by: STUDENT IN AN ORGANIZED HEALTH CARE EDUCATION/TRAINING PROGRAM

## 2021-08-12 PROCEDURE — 90732 PPSV23 VACC 2 YRS+ SUBQ/IM: CPT | Performed by: STUDENT IN AN ORGANIZED HEALTH CARE EDUCATION/TRAINING PROGRAM

## 2021-08-12 PROCEDURE — 90472 IMMUNIZATION ADMIN EACH ADD: CPT | Mod: 59 | Performed by: STUDENT IN AN ORGANIZED HEALTH CARE EDUCATION/TRAINING PROGRAM

## 2021-08-12 PROCEDURE — 90715 TDAP VACCINE 7 YRS/> IM: CPT | Performed by: STUDENT IN AN ORGANIZED HEALTH CARE EDUCATION/TRAINING PROGRAM

## 2021-08-12 PROCEDURE — 36415 COLL VENOUS BLD VENIPUNCTURE: CPT | Performed by: STUDENT IN AN ORGANIZED HEALTH CARE EDUCATION/TRAINING PROGRAM

## 2021-08-12 PROCEDURE — 99214 OFFICE O/P EST MOD 30 MIN: CPT | Mod: 25 | Performed by: STUDENT IN AN ORGANIZED HEALTH CARE EDUCATION/TRAINING PROGRAM

## 2021-08-12 RX ORDER — POLYETHYLENE GLYCOL 3350 17 G/17G
1 POWDER, FOR SOLUTION ORAL DAILY
Qty: 850 G | Refills: 1 | Status: SHIPPED | OUTPATIENT
Start: 2021-08-12

## 2021-08-12 RX ORDER — SENNA AND DOCUSATE SODIUM 50; 8.6 MG/1; MG/1
1 TABLET, FILM COATED ORAL AT BEDTIME
Qty: 30 TABLET | Refills: 1 | Status: SHIPPED | OUTPATIENT
Start: 2021-08-12 | End: 2021-11-11

## 2021-08-12 ASSESSMENT — PATIENT HEALTH QUESTIONNAIRE - PHQ9: SUM OF ALL RESPONSES TO PHQ QUESTIONS 1-9: 3

## 2021-08-12 NOTE — PATIENT INSTRUCTIONS
"[facility]   [address]  [place], [place]  [phone]  (other locations available, can call main #)     [facility]   [place] office  [address] [place], [place]  [phone]     [facility]   [address] [place]  [address]  [phone]      [facility]   [address]  [place], [place]    [phone]     [facility] Location  [address]  [place], [place]  [phone]     [facility]  [phone]  Multiple locations, main number is above         Patient Instructions     Use something to treat constipation daily, as prescribed.    Eat fruits, especially the \"P\" fruits (peaches, pears, prunes, etc) to help keep stools soft.   Increase vegetables in the diet.   Switch from white breads and rice to brown breads and rice to increase fiber.    Use the \"gastrocolic reflex\" to help you have a bowel movement - sit on the toilet after eating, your body naturally sends a message to have a bowel movement after eating. Try not to push.  Come back to see us if not improved.      Patient Education   Here is the plan from today's visit    1. Persistent asthma without complication, unspecified asthma severity  We don't know if you have COPD and asthma or one or the other? We are trying to figure it all out. This is why we are doing a blood test and lung function testing.   - General PFT Lab (Please always keep checked); Future  - Pulmonary Function Test; Future  - Eosinophil Count ([facility])  - tiotropium (SPIRIVA RESPIMAT) 2.5 MCG/ACT inhaler; Inhale 2 puffs into the lungs daily  Dispense: 4 g; Refill: 1    2. Depression, unspecified depression type  See therapy list above.     3. Chronic idiopathic constipation  - polyethylene glycol (MIRALAX) 17 GM/Dose powder; Take 17 g (1 capful) by mouth daily  Dispense: 850 g; Refill: 1  - SENNA-docusate sodium (SENNA S) 8.6-50 MG tablet; Take 1 tablet by mouth At Bedtime  " Dispense: 30 tablet; Refill: 1      Please call or return to clinic if your symptoms don't go away.    Follow up plan  No follow-ups on file.    Thank you for coming to [facility] today.  COVID-19 Vaccine:  Starting Monday 8/2/21: [facility] will have walk-in appointments for Moderna, Pfizer and Bobby&Bobby vaccines. No appointment needed! You will also have the option of receiving Moderna vaccine during your physician appointment. Please ask your care team for more information!  You may be eligible for a $100 Visa giftcard if you receive your first dose between Friday July 30th and Sunday August 15th. Visit https://[place].gov/covid19/100/ for more information.   Lab Testing:  **If you had lab testing today and your results are reassuring or normal they will be mailed to you or sent through [employer] within 7 days.   **If the lab tests need quick action we will call you with the results.  **If you are having labs done on a different day, please call [phone] to schedule at [facility] or [phone] for other [facility] locations.   The phone number we will call with results is # [phone] (home) . If this is not the best number please call our clinic and change the number.  Medication Refills:  If you need any refills please call your pharmacy and they will contact us.   If you need to  your refill at a new pharmacy, please contact the new pharmacy directly. The new pharmacy will help you get your medications transferred faster.   Scheduling:  If you have any concerns about today's visit or wish to schedule another appointment please call our office during normal business hours [phone] (8-5:00 M-F)  If a referral was made to a [facility] and you don't get a call from central scheduling please call [phone].  If a Mammogram was ordered for you at [facility] call [phone] to schedule or change your appointment.  If you had an  EKG/XRay/CT/Ultrasound/MRI ordered the number is [phone] to schedule or change your radiology appointment.   Medical Concerns:  If you have urgent medical concerns please call [phone] at any time of the day.    Shawna Pelayo, DO

## 2021-08-12 NOTE — PROGRESS NOTES
Preceptor Attestation:   Patient seen, evaluated and discussed with the resident. I have verified the content of the note, which accurately reflects my assessment of the patient and the plan of care.   Supervising Physician:  Miquel Turcios MD

## 2021-08-12 NOTE — TELEPHONE ENCOUNTER
Screening Questions  1. Are you active on mychart? Will try to get in again     2. What insurance is in the chart? [employer]    2.  Ordering/Referring Provider: Cesar Corrales MD in [facility]    3. BMI 33.2     4. Are you on daily home oxygen?  No     5. Do you have a history of difficult airway? No     6. Have you had a heart, lung, or liver transplant? No     7. Are you currently on dialysis? No     8. Have you had a stroke or Transient ischemic atttack (TIA) within 6 months? No     9. In the past 6 months, have you had any heart related issues including cardiomyopathy or heart attack?         If yes, did it require cardiac stenting or other implantable device? No     10. Do you have any implantable devices in your body (pacemaker, defib, LVAD)? No     11. Do you take nitroglycerin? If yes, how often?  No     12. Are you currently taking any blood thinners? No     13. Are you a diabetic? No     14. (Females) Are you currently pregnant? No   If yes, how many weeks?    15. Have you had a procedure in the past that was difficult to tolerate with conscious sedation? Any allergies to Fentanyl or Versed  No     16. Are you taking any scheduled prescription narcotics more than once daily? no    17. Do you have any chemical dependencies such as alcohol, street drugs, or methadone? No     18. Do you have any history of post-traumatic stress syndrome or mental health issues? No     19. Do you transfer independently? Yes     20.  Do you have any issues with constipation? Yes     21. [facility] [address]     Scheduling Details    Which Colonoscopy Prep was Sent?: Ext Prep- MyChart & Mail  Procedure Scheduled: Colonoscopy   Provider/Surgeon: cami   Date of Procedure: 08/30/21  [place] [facility] [place]er (Please ask for phone number if not scheduled by patient): patient      Sedation Type: C. Sedation   Conscious Sedation- Needs  for 6 hours after the  procedure  MAC/General-Needs  for 24 hours after procedure    Pre-op Required at [facility] [place], [place] and OR for MAC sedation:   (if yes advise patient they will need a pre-op prior to procedure)      Is patient on blood thinners? -no  (If yes- inform patient to follow up with PCP or provider for follow up instructions)     Informed patient they will need an adult  yes   Cannot take any type of public or medical transportation alone    Informed Patient of COVID Test Requirement yes -scheduled     Confirmed Nurse will call to complete assessment yes     Additional comments:

## 2021-08-12 NOTE — TELEPHONE ENCOUNTER
FUTURE VISIT INFORMATION      FUTURE VISIT INFORMATION:    Date: 11/8/21    Time: 9 AM    [place] [facility] [place]ERRAL INFORMATION:    Referring provider: Dr. 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 hWit Guzman    [facility] 8/12/21 - PCC OV with Dr. Pelayo  7/28/21 - [facility] OV with Dr. Pierce Guzman    [facility] - Surgery 12/5/14 - OP Note for EXCISION RIGHT SUBMANDIBULAR GLAND with Dr. Braov Care Everywhere    [facility] - Imaging 10/16/18 - CTA Head Neck  12/11/04 - CT Neck  11/10/14 - CT Neck Care Everywhere PACs

## 2021-08-13 ASSESSMENT — ASTHMA QUESTIONNAIRES: ACT_TOTALSCORE: 21

## 2021-08-17 NOTE — TELEPHONE ENCOUNTER
8/17/21 Spoke to patient today, she was with roadside assistance. Sending letter to patient home.    Susanna Valdivia  Care Coordinator

## 2021-08-18 DIAGNOSIS — R05.9 COUGH: ICD-10-CM

## 2021-08-18 DIAGNOSIS — R91.8 ABNORMAL FINDINGS ON DIAGNOSTIC IMAGING OF LUNG: ICD-10-CM

## 2021-08-18 DIAGNOSIS — Z87.891 PERSONAL HISTORY OF TOBACCO USE: ICD-10-CM

## 2021-08-18 DIAGNOSIS — R06.00 DYSPNEA, UNSPECIFIED TYPE: ICD-10-CM

## 2021-08-18 DIAGNOSIS — J45.909 PERSISTENT ASTHMA WITHOUT COMPLICATION, UNSPECIFIED ASTHMA SEVERITY: ICD-10-CM

## 2021-08-18 PROCEDURE — 94729 DIFFUSING CAPACITY: CPT | Performed by: INTERNAL MEDICINE

## 2021-08-18 PROCEDURE — 94726 PLETHYSMOGRAPHY LUNG VOLUMES: CPT | Performed by: INTERNAL MEDICINE

## 2021-08-18 PROCEDURE — 94060 EVALUATION OF WHEEZING: CPT | Performed by: INTERNAL MEDICINE

## 2021-08-20 ENCOUNTER — TELEPHONE (OUTPATIENT)
Dept: GASTROENTEROLOGY | Facility: CLINIC | Age: 61
End: 2021-08-20

## 2021-08-20 DIAGNOSIS — Z12.11 SPECIAL SCREENING FOR MALIGNANT NEOPLASMS, COLON: Primary | ICD-10-CM

## 2021-08-20 RX ORDER — BISACODYL 5 MG
TABLET, DELAYED RELEASE (ENTERIC COATED) ORAL
Qty: 2 TABLET | Refills: 0 | Status: SHIPPED | OUTPATIENT
Start: 2021-08-20

## 2021-08-20 NOTE — LETTER
August 20, 2021      Vandana Crowder  [address]    [facility] [address]              Dear Vandana,          Colonoscopy  Your exam is on 8/30/21  Arrival Time: 2:25pm  Please note that your procedure time may change  [facility]; [address], [address], [place] [address]    Please arrive with an adult who can drive you home after the exam and stay with you for the next 24 hours, unless your provider says otherwise.   The medicines used in the exam will make you sleepy. You will not be able to drive. You cannot take a medical cab, taxi, Uber, train or bus by yourself.    For questions or appointments, call: [facility]: [phone] Monday through Friday, 7 a.m. to 4:30 p.m.  (If it is after hours, call [phone]. Ask for the GI fellow resident on call.)  --------------------------------------------------------------------------------    Extended Colonoscopy Prep    The inside of your intestine must be clean in order to allow examination of the colon for presence of any growths and abnormalities, as well as their biopsy or removal. A number of tips are included in order to make this part of the procedure as comfortable as possible.        Immediately: 8/23/21    Discontinue iron supplements and multivitamins    Fill your prescription for Golytely (2 containers), a bottle of Magnesium Citrate, and 2 Dulcolax tablets at the pharmacy.    It is very important that you stay well hydrated during the colonoscopy prep. The large volume of the bowel cleansing liquid is designed to clean your colon, but it will not provide hydration. While you are getting the prescribed prep, you may also get 64 oz. of Gatorade or similar sports drink product. (Avoid red and purple colors)    Discontinue Fiber supplements    5 days prior: 8/25/21    Begin a restricted, low fiber diet    2 days prior: 8/28/21    Remember to discontinue  NSAIDs, example: Advil, Aleve, Ibuprofen, Naproxen, Motrin    Make sure to stay well hydrated, drink at least 4 large glasses of Gatorade or similar sports drink    Drink to be well hydrated, this is important.    Discontinue non-steroidal anti-inflammatory medications as these drugs may increase the risk of bleeding.    4:00 PM: Drink 10oz Magnesium Citrate (one bottle)    1 day prior: 8/29/21    Plan on being at home this day.    Begin clear liquid diet only.    Avoid any red or purple colored items    10:00 AM:  Take 2 Dulcolax tablets at 10 AM    3:00 PM:  You will start drinking the Golytely solution.  Drink one, eight oz. glass every 10-15 minutes until   of the 1st container of Golytely is gone.    You will likely start having frequent liquid bowel movements within an hour of drinking the Golytely solution.    8:00 PM: Drink the second half of the 1st container of Golytely.  Drink one, eight oz. glass every 10-15 minutes until   of the 1st container of Golytely is gone.    The prep liquid will not keep you hydrated. You should continue drinking clear liquids throughout the day.    Before you go to bed, mix the 2nd container of Golytely.    Procedure day: 8/30/21    6 hours before your check-in time @ 8:25am, drink one, eight oz. glass every 10-15 minutes until   of the 2nd  container of Golytely is gone.    You may take your necessary morning medications.    You can have clear liquids until 4 hours prior to your exam.    Be sure to have a     Please do your nebs and airway clearance therapy in the morning prior to the procedure.    Please arrive with an adult who can drive you home after the exam. If using public transportation you must have someone to ride with you.      What are clear liquids?   You may have:  - Water, tea, coffee (no cream)  - Soda pop, Gatorade (not red or purple)  - Clear nutrition drinks (Enlive, Resource Breeze)   - Jell-O, Popsicles (no milk or fruit pieces) or sorbet (not red  or purple)  - Fat-free soup broth or bouillon  - Plain hard candy, such as clear life savers (not red or purple)  - Clear juices and fruit-flavored drinks such as apple juice, white grape juice, Hi-C and John-Aid (not red or purple)   Do not have:  - Milk or milk products such as ice cream, malts or shakes  - Red or purple drinks of any kind such as cranberry juice or grape juice. Avoid red or purple Jell-O, Popsicles, John-Aid, sorbet and candy  - Juices with pulp such as orange, grapefruit, pineapple or tomato juice  - Cream soups of any kind  - Alcohol       What is a Low Fiber Diet?   You may have:  - Starches: White bread, rolls, biscuits, croissants, [place] toast, white flour tortillas, waffles, pancakes, Uzbek toast; white rice, noodles, pasta, macaroni; cooked and peeled potatoes; plain crackers, saltines; cooked farina or cream of rice; puffed rice, corn flakes, Rice Krispies, Special K   - Vegetables: vegetable broths   - Fruits and fruit juices: Strained fruit juice, canned fruit without seeds or skin (not pineapple), applesauce, pear sauce, ripe bananas, melons (not watermelon)   - Milk products: Milk (plain or flavored), cheese, cottage cheese, yogurt (no berries), custard, ice cream    - Proteins: Tender, well-cooked ground beef, lamb, veal, ham, pork, chicken, turkey, fish or organ meats; eggs; creamy peanut butter   - Fats and condiments:  Margarine, butter, oils, mayonnaise, sour cream, salad dressing, plain gravy; spices, cooked herbs; sugar, clear jelly, honey, syrup   - Snacks, sweets and drinks: Pretzels, hard candy; plain cakes and cookies (no nuts or seeds); gelatin, plain pudding, sherbet, Popsicles; coffee, tea, carbonated ( fizzy ) drinks Do not have:  - Starches: Breads or rolls that contain nuts, seeds or fruit; whole wheat or whole grain breads that contain more than 1 gram of fiber per slice; cornbread; corn or whole wheat tortillas; potatoes with skin; brown rice, wild rice, kasha  (buckwheat)   - Vegetables: Any raw or steamed vegetables; vegetables with seeds; corn in any form   - Fruits and fruit juices: Prunes, prune juice, raisins and other dried fruits, berries and other fruits with seeds, canned pineapple uices with pulp such as orange, grapefruit, pineapple or tomato juice  - Milk products: Any yogurt with nuts, seeds or berries   - Proteins: Tough, fibrous meats with gristle; cooked dried beans, peas or lentils; crunchy peanut butter   - Fats and condiments: Pickles, olives, relish, horseradish; jam, marmalade, preserves   - Snacks, sweets and drinks: Popcorn, nuts, seeds, granola, coconut, candies made with nuts or seeds; all desserts that contain nuts, seeds, raisins and other dried fruits, coconut, whole grains or bran.        Thank you for choosing [facility], for your procedure. If you are sent a survey regarding your care, please take the time to complete the questionnaire.

## 2021-08-20 NOTE — TELEPHONE ENCOUNTER
Pre assessment questions completed for upcoming colonoscopy procedure scheduled on 8/30/21    COVID test scheduled 8/27/21    Procedural arrival time and facility location reviewed.    Designated  policy reviewed.    Extended prep script sent to [id number] - [place], [place] - [address] pharmacy. Prep instructions sent via [employer] and letter. Patient with self report of constipation.     Reviewed Extended prep instructions with patient. Patient states that they will look at instructions via mychart. Instructed to contact endoscopy should they not be able to access information. Patient agreed to plan.     Anticoagulation/blood thinners? ASA 81mg    Electronic implanted devices? no    Patient verbalized understanding and had no questions or concerns at this time.    Juliana Lorenzo RN

## 2021-08-22 LAB
DLCOCOR-%PRED-PRE: 74 %
DLCOCOR-PRE: 16.42 ML/MIN/MMHG
DLCOUNC-%PRED-PRE: 72 %
DLCOUNC-PRE: 16.01 ML/MIN/MMHG
DLCOUNC-PRED: 22.16 ML/MIN/MMHG
ERV-%PRED-PRE: 38 %
ERV-PRE: 0.22 L
ERV-PRED: 0.57 L
EXPTIME-PRE: 7.84 SEC
FEF2575-%PRED-POST: 27 %
FEF2575-%PRED-PRE: 26 %
FEF2575-POST: 0.61 L/SEC
FEF2575-PRE: 0.58 L/SEC
FEF2575-PRED: 2.21 L/SEC
FEFMAX-%PRED-PRE: 62 %
FEFMAX-PRE: 4.17 L/SEC
FEFMAX-PRED: 6.65 L/SEC
FEV1-%PRED-PRE: 44 %
FEV1-PRE: 1.12 L
FEV1FEV6-PRE: 62 %
FEV1FEV6-PRED: 80 %
FEV1FVC-PRE: 60 %
FEV1FVC-PRED: 80 %
FEV1SVC-PRE: 56 %
FEV1SVC-PRED: 69 %
FIFMAX-PRE: 3.07 L/SEC
FRCPLETH-%PRED-PRE: 115 %
FRCPLETH-PRE: 3.32 L
FRCPLETH-PRED: 2.87 L
FVC-%PRED-PRE: 58 %
FVC-PRE: 1.86 L
FVC-PRED: 3.17 L
IC-%PRED-PRE: 58 %
IC-PRE: 1.78 L
IC-PRED: 3.04 L
RVPLETH-%PRED-PRE: 149 %
RVPLETH-PRE: 3.1 L
RVPLETH-PRED: 2.07 L
TLCPLETH-%PRED-PRE: 93 %
TLCPLETH-PRE: 5.1 L
TLCPLETH-PRED: 5.44 L
VA-%PRED-PRE: 67 %
VA-PRE: 3.66 L
VC-%PRED-PRE: 55 %
VC-PRE: 2 L
VC-PRED: 3.61 L

## 2021-08-25 ENCOUNTER — TELEPHONE (OUTPATIENT)
Dept: FAMILY MEDICINE | Facility: CLINIC | Age: 61
End: 2021-08-25

## 2021-08-25 NOTE — TELEPHONE ENCOUNTER
[facility] phone call message- medication clarification/question:    Full Medication Name:   tiotropium (SPIRIVA RESPIMAT) 2.5 MCG/ACT inhaler 4       Dose: Sig - Route: Inhale 2 puffs into the lungs daily - Inhalation        Question/Clarification needed: Patient attempted to fill RX and it is $200.00. Patient would like a cheaper RX please      Pharmacy confirmed [facility]   [id number] [place], [place] - [address]  [address]  [facility] [address]  Phone: [phone] Fax: [phone]      Please leave ONLY preferred pharmacy    OK to leave a message on voice mail? Yes    Advised patient that RN would call back within 3 hours, unless emergent.    Primary language: English      needed? No    Call taken on August 25, 2021 at 4:07 PM by Iza Lema to WALTER GARRETT

## 2021-08-25 NOTE — TELEPHONE ENCOUNTER
Patient attempted to fill tiotropium (Spiriva respimat), out of pocket cost is $200. Patient looking for alternative. RN will route to PCP and PharmD to advise.     Tom Pennington RN

## 2021-08-26 NOTE — TELEPHONE ENCOUNTER
"RN sent Niwa message with information recommended from pharmacy.     Tom Pennington RN      \"Miriam Gordon, [facility]  Tom Pennington RN; Shawna Pelayo DO  Cc: [facility]  Caller: Unspecified (Yesterday,  4:06 PM)  It's possible the patient has a deductible they need to meet. I recommend having the patient call the customer service number on the back of their insurance card to determine if this was a regular copay or a deductible. If it is a regular copay, they could ask the insurance for covered alternatives. I'm not sure what their prescription insurance is, so I'm unable to call the insurance myself.   \"    "

## 2021-08-27 ENCOUNTER — LAB (OUTPATIENT)
Dept: LAB | Facility: CLINIC | Age: 61
End: 2021-08-27
Payer: COMMERCIAL

## 2021-08-27 DIAGNOSIS — Z11.59 ENCOUNTER FOR SCREENING FOR OTHER VIRAL DISEASES: ICD-10-CM

## 2021-08-27 PROCEDURE — U0003 INFECTIOUS AGENT DETECTION BY NUCLEIC ACID (DNA OR RNA); SEVERE ACUTE RESPIRATORY SYNDROME CORONAVIRUS 2 (SARS-COV-2) (CORONAVIRUS DISEASE [COVID-19]), AMPLIFIED PROBE TECHNIQUE, MAKING USE OF HIGH THROUGHPUT TECHNOLOGIES AS DESCRIBED BY CMS-2020-01-R: HCPCS | Mod: 90 | Performed by: PATHOLOGY

## 2021-08-27 PROCEDURE — U0005 INFEC AGEN DETEC AMPLI PROBE: HCPCS | Mod: 90 | Performed by: PATHOLOGY

## 2021-08-28 LAB — SARS-COV-2 RNA RESP QL NAA+PROBE: NEGATIVE

## 2021-08-30 ENCOUNTER — HOSPITAL ENCOUNTER (OUTPATIENT)
Facility: AMBULATORY SURGERY CENTER | Age: 61
End: 2021-08-30
Attending: INTERNAL MEDICINE
Payer: COMMERCIAL

## 2021-08-30 ENCOUNTER — ANESTHESIA EVENT (OUTPATIENT)
Dept: SURGERY | Facility: AMBULATORY SURGERY CENTER | Age: 61
End: 2021-08-30
Payer: COMMERCIAL

## 2021-08-30 ENCOUNTER — ANESTHESIA (OUTPATIENT)
Dept: SURGERY | Facility: AMBULATORY SURGERY CENTER | Age: 61
End: 2021-08-30
Payer: COMMERCIAL

## 2021-08-30 VITALS
BODY MASS INDEX: 33.27 KG/M2 | WEIGHT: 212 LBS | HEART RATE: 79 BPM | HEIGHT: 67 IN | SYSTOLIC BLOOD PRESSURE: 114 MMHG | DIASTOLIC BLOOD PRESSURE: 75 MMHG | OXYGEN SATURATION: 100 % | RESPIRATION RATE: 14 BRPM | TEMPERATURE: 97 F

## 2021-08-30 VITALS — HEART RATE: 77 BPM

## 2021-08-30 LAB — COLONOSCOPY: NORMAL

## 2021-08-30 PROCEDURE — 88305 TISSUE EXAM BY PATHOLOGIST: CPT | Mod: 26 | Performed by: PATHOLOGY

## 2021-08-30 PROCEDURE — 88305 TISSUE EXAM BY PATHOLOGIST: CPT | Mod: TC | Performed by: INTERNAL MEDICINE

## 2021-08-30 PROCEDURE — 45385 COLONOSCOPY W/LESION REMOVAL: CPT | Mod: 33

## 2021-08-30 RX ORDER — NALOXONE HYDROCHLORIDE 0.4 MG/ML
0.4 INJECTION, SOLUTION INTRAMUSCULAR; INTRAVENOUS; SUBCUTANEOUS
Status: DISCONTINUED | OUTPATIENT
Start: 2021-08-30 | End: 2021-08-31 | Stop reason: HOSPADM

## 2021-08-30 RX ORDER — ONDANSETRON 4 MG/1
4 TABLET, ORALLY DISINTEGRATING ORAL EVERY 6 HOURS PRN
Status: DISCONTINUED | OUTPATIENT
Start: 2021-08-30 | End: 2021-08-31 | Stop reason: HOSPADM

## 2021-08-30 RX ORDER — ONDANSETRON 2 MG/ML
4 INJECTION INTRAMUSCULAR; INTRAVENOUS
Status: DISCONTINUED | OUTPATIENT
Start: 2021-08-30 | End: 2021-08-30 | Stop reason: HOSPADM

## 2021-08-30 RX ORDER — LIDOCAINE HYDROCHLORIDE 20 MG/ML
INJECTION, SOLUTION INFILTRATION; PERINEURAL PRN
Status: DISCONTINUED | OUTPATIENT
Start: 2021-08-30 | End: 2021-08-30

## 2021-08-30 RX ORDER — SODIUM CHLORIDE, SODIUM LACTATE, POTASSIUM CHLORIDE, CALCIUM CHLORIDE 600; 310; 30; 20 MG/100ML; MG/100ML; MG/100ML; MG/100ML
500 INJECTION, SOLUTION INTRAVENOUS CONTINUOUS
Status: DISCONTINUED | OUTPATIENT
Start: 2021-08-30 | End: 2021-08-30 | Stop reason: HOSPADM

## 2021-08-30 RX ORDER — LIDOCAINE 40 MG/G
CREAM TOPICAL
Status: DISCONTINUED | OUTPATIENT
Start: 2021-08-30 | End: 2021-08-30 | Stop reason: HOSPADM

## 2021-08-30 RX ORDER — NALOXONE HYDROCHLORIDE 0.4 MG/ML
0.2 INJECTION, SOLUTION INTRAMUSCULAR; INTRAVENOUS; SUBCUTANEOUS
Status: DISCONTINUED | OUTPATIENT
Start: 2021-08-30 | End: 2021-08-31 | Stop reason: HOSPADM

## 2021-08-30 RX ORDER — FLUMAZENIL 0.1 MG/ML
0.2 INJECTION, SOLUTION INTRAVENOUS
Status: DISCONTINUED | OUTPATIENT
Start: 2021-08-30 | End: 2021-08-31 | Stop reason: HOSPADM

## 2021-08-30 RX ORDER — PROCHLORPERAZINE MALEATE 10 MG
10 TABLET ORAL EVERY 6 HOURS PRN
Status: DISCONTINUED | OUTPATIENT
Start: 2021-08-30 | End: 2021-08-31 | Stop reason: HOSPADM

## 2021-08-30 RX ORDER — SIMETHICONE
LIQUID (ML) MISCELLANEOUS PRN
Status: DISCONTINUED | OUTPATIENT
Start: 2021-08-30 | End: 2021-08-30 | Stop reason: HOSPADM

## 2021-08-30 RX ORDER — PROPOFOL 10 MG/ML
INJECTION, EMULSION INTRAVENOUS CONTINUOUS PRN
Status: DISCONTINUED | OUTPATIENT
Start: 2021-08-30 | End: 2021-08-30

## 2021-08-30 RX ORDER — PROPOFOL 10 MG/ML
INJECTION, EMULSION INTRAVENOUS PRN
Status: DISCONTINUED | OUTPATIENT
Start: 2021-08-30 | End: 2021-08-30

## 2021-08-30 RX ORDER — ONDANSETRON 2 MG/ML
4 INJECTION INTRAMUSCULAR; INTRAVENOUS EVERY 6 HOURS PRN
Status: DISCONTINUED | OUTPATIENT
Start: 2021-08-30 | End: 2021-08-31 | Stop reason: HOSPADM

## 2021-08-30 RX ADMIN — LIDOCAINE HYDROCHLORIDE 60 MG: 20 INJECTION, SOLUTION INFILTRATION; PERINEURAL at 15:04

## 2021-08-30 RX ADMIN — PROPOFOL 20 MG: 10 INJECTION, EMULSION INTRAVENOUS at 15:10

## 2021-08-30 RX ADMIN — SODIUM CHLORIDE, SODIUM LACTATE, POTASSIUM CHLORIDE, CALCIUM CHLORIDE: 600; 310; 30; 20 INJECTION, SOLUTION INTRAVENOUS at 14:57

## 2021-08-30 RX ADMIN — PROPOFOL 200 MCG/KG/MIN: 10 INJECTION, EMULSION INTRAVENOUS at 15:09

## 2021-08-30 ASSESSMENT — LIFESTYLE VARIABLES: TOBACCO_USE: 1

## 2021-08-30 ASSESSMENT — COPD QUESTIONNAIRES: COPD: 1

## 2021-08-30 ASSESSMENT — MIFFLIN-ST. JEOR: SCORE: 1553.13

## 2021-08-30 NOTE — ANESTHESIA POSTPROCEDURE EVALUATION
Patient: Vandana Crowder    Procedure(s):  COLONOSCOPY, WITH POLYPECTOMY    Diagnosis:Colon cancer screening [Z12.11]  Diagnosis Additional Information: No value filed.    Anesthesia Type:  MAC    Note:  Disposition: Outpatient   Postop Pain Control: Uneventful            Sign Out: Well controlled pain   PONV: No   Neuro/Psych: Uneventful            Sign Out: Acceptable/Baseline neuro status   Airway/Respiratory: Uneventful            Sign Out: Acceptable/Baseline resp. status   CV/Hemodynamics: Uneventful            Sign Out: Acceptable CV status; No obvious hypovolemia; No obvious fluid overload   Other NRE: NONE   DID A NON-ROUTINE EVENT OCCUR? No           Last vitals:  Vitals Value Taken Time   /75 08/30/21 1543   Temp 36.1  C (97  F) 08/30/21 1543   Pulse 79 08/30/21 1543   Resp 14 08/30/21 1543   SpO2 100 % 08/30/21 1543       Electronically Signed By: Varsha Amador MD  August 30, 2021  3:51 PM  
None

## 2021-08-30 NOTE — ANESTHESIA PREPROCEDURE EVALUATION
Anesthesia Pre-Procedure Evaluation    Patient: Vandana Crowder   MRN: [id number] : 1960        Preoperative Diagnosis: Colon cancer screening [Z12.11]   Procedure : Procedure(s):  COLONOSCOPY     Past Medical History:   Diagnosis Date     Hyperlipidemia      Hypertension       No past surgical history on file.   Allergies   Allergen Reactions     Latex Itching and Swelling      Social History     Tobacco Use     Smoking status: Current Every Day Smoker     Types: Cigarettes     Start date: 2021     Smokeless tobacco: Never Used     Tobacco comment: 3-4 cigarettes daily   Substance Use Topics     Alcohol use: Yes     Alcohol/week: 7.0 standard drinks     Types: 7 Shots of liquor per week     Comment: 2 oz Vodka every evening      Wt Readings from Last 1 Encounters:   21 96.2 kg (212 lb)        Anesthesia Evaluation            ROS/MED HX  ENT/Pulmonary:     (+) RICO risk factors, tobacco use, Current use, asthma COPD,     Neurologic:  - neg neurologic ROS   (+) CVA, without deficits,     Cardiovascular:     (+) Dyslipidemia hypertension-----    METS/Exercise Tolerance:     Hematologic:  - neg hematologic  ROS     Musculoskeletal:  - neg musculoskeletal ROS     GI/Hepatic:     (+) hepatitis type C,     Renal/Genitourinary:  - neg Renal ROS     Endo:  - neg endo ROS     Psychiatric/Substance Use:     (+) psychiatric history depression     Infectious Disease:  - neg infectious disease ROS     Malignancy:       Other:  - neg other ROS          Physical Exam    Airway  airway exam normal           Respiratory Devices and Support         Dental  no notable dental history         Cardiovascular   cardiovascular exam normal          Pulmonary   pulmonary exam normal                OUTSIDE LABS:  CBC:   Lab Results   Component Value Date    WBC 7.4 2021    WBC 4.6 2015    HGB 12.6 2021    HGB 12.4 2015    HCT 39.4 2021    HCT 37.6 2015     2021      07/07/2015     BMP:   Lab Results   Component Value Date     07/28/2021     05/12/2015    POTASSIUM 4.9 07/28/2021    POTASSIUM 3.8 05/12/2015    CHLORIDE 108 07/28/2021    CHLORIDE 108 05/12/2015    CO2 23 07/28/2021    CO2 22 05/12/2015    BUN 13 07/28/2021    BUN 15 05/12/2015    CR 0.71 07/28/2021    CR 0.64 05/12/2015    GLC 86 07/28/2021     (H) 05/12/2015     COAGS:   Lab Results   Component Value Date    INR 0.95 07/07/2015     POC: No results found for: BGM, HCG, HCGS  HEPATIC:   Lab Results   Component Value Date    ALBUMIN 3.8 07/28/2021    PROTTOTAL 7.8 07/28/2021    ALT 20 07/28/2021    AST 16 07/28/2021    ALKPHOS 69 07/28/2021    BILITOTAL 0.3 07/28/2021     OTHER:   Lab Results   Component Value Date    MARVA 9.2 07/28/2021       Anesthesia Plan    ASA Status:  3   NPO Status:  NPO Appropriate    Anesthesia Type: MAC.     - Reason for MAC: straight local not clinically adequate   Induction: Intravenous.   Maintenance: TIVA.        Consents    Anesthesia Plan(s) and associated risks, benefits, and realistic alternatives discussed. Questions answered and patient/representative(s) expressed understanding.     - Discussed with:  Patient      - Extended Intubation/Ventilatory Support Discussed: No.      - Patient is DNR/DNI Status: No    Use of blood products discussed: No .     Postoperative Care    Pain management: Multi-modal analgesia.   PONV prophylaxis: Background Propofol Infusion     Comments:         H&P reviewed: Unable to attach H&P to encounter due to EHR limitations. H&P Update: appropriate H&P reviewed, patient examined. No interval changes since H&P (within 30 days).         Joe Rosales MD

## 2021-08-30 NOTE — H&P
Vandana GREEN Vel  [id number]  female  61 year old      Reason for procedure/surgery: colon cancer screening    Patient Active Problem List   Diagnosis     Chronic hepatitis C (H)     Asthma     Itching     Personal history of tobacco use     Stroke (H)     Abnormal findings on diagnostic imaging of lung     Benign essential hypertension     Depression     Angio-edema     Submandibular abscess     Snoring     Morbid obesity (H)     Krystal's edema of vocal folds     H/O submandibular gland removal       Past Surgical History:  History reviewed. No pertinent surgical history.    Past Medical History:   Past Medical History:   Diagnosis Date     Hyperlipidemia      Hypertension        Social History:   Social History     Tobacco Use     Smoking status: Current Every Day Smoker     Types: Cigarettes     Start date: 8/5/2021     Smokeless tobacco: Never Used     Tobacco comment: 3-4 cigarettes daily   Substance Use Topics     Alcohol use: Yes     Alcohol/week: 7.0 standard drinks     Types: 7 Shots of liquor per week     Comment: 2 oz Vodka every evening       Family History:   Family History   Problem Relation Age of Onset     Throat cancer Father      Other - See Comments Sister         gunshot to head lead to long term brain injury     Snoring Brother      Brain Hemorrhage Brother         aneurysm     Dementia Brother        Allergies:   Allergies   Allergen Reactions     Latex Itching and Swelling       Active Medications:   Current Outpatient Medications   Medication Sig Dispense Refill     albuterol (PROAIR HFA, PROVENTIL HFA, VENTOLIN HFA) 108 (90 BASE) MCG/ACT inhaler Inhale 2 puffs into the lungs every 6 hours       amLODIPine (NORVASC) 10 MG tablet Take 1 tablet (10 mg) by mouth daily 90 tablet 0     aspirin (ASA) 81 MG EC tablet Take 1 tablet (81 mg) by mouth daily 90 tablet 0     bisacodyl (DULCOLAX) 5 MG EC tablet Take as directed. One day prior to exam at 10:00am take 2 tablets 2 tablet 0     magnesium  "citrate solution Take as directed. Two days prior to exam drink 10oz bottle of magnesium citrate at 4:00pm 296 mL 0     nicotine (NICODERM CQ) 14 MG/24HR 24 hr patch Place 1 patch onto the skin       polyethylene glycol (GOLYTELY) 236 g suspension Take as directed. One day before your exam fill the first container with water. Cover and shake until mixed well. At 3:00pm drink one 8oz glass every 10-15 minutes until half of the first container is empty. Store the remainder in the refrigerator. At 8:00pm drink the second half of the first container until it is gone. Before you go to bed mix the second container with water and put in refrigerator. Six hours before your check in time drink one 8oz glass every 10-15 minutes until half of container is empty. Discard the remainder of solution. 8000 mL 0     polyethylene glycol (MIRALAX) 17 GM/Dose powder Take 17 g (1 capful) by mouth daily 850 g 1     SENNA-docusate sodium (SENNA S) 8.6-50 MG tablet Take 1 tablet by mouth At Bedtime 30 tablet 1     simvastatin (ZOCOR) 20 MG tablet Take 1 tablet (20 mg) by mouth daily 90 tablet 0     tiotropium (SPIRIVA RESPIMAT) 2.5 MCG/ACT inhaler Inhale 2 puffs into the lungs daily 4 g 1       Systemic Review:   CONSTITUTIONAL: NEGATIVE for fever, chills, change in weight  ENT/MOUTH: NEGATIVE for ear, mouth and throat problems  RESP: NEGATIVE for significant cough or SOB  CV: NEGATIVE for chest pain, palpitations or peripheral edema    Physical Examination:   Vital Signs: BP (!) 147/91   Temp 96.9  F (36.1  C) (Temporal)   Resp 16   Ht 1.7 m (5' 6.93\")   Wt 96.2 kg (212 lb)   LMP  (LMP Unknown)   SpO2 98%   BMI 33.27 kg/m    GENERAL: healthy, alert and no distress  NECK: no adenopathy, no asymmetry, masses, or scars  RESP: lungs clear to auscultation - no rales, rhonchi or wheezes  CV: regular rate and rhythm, normal S1 S2, no S3 or S4, no murmur, click or rub, no peripheral edema and peripheral pulses strong  ABDOMEN: soft, " nontender, no hepatosplenomegaly, no masses and bowel sounds normal  MS: no gross musculoskeletal defects noted, no edema    Plan: Appropriate to proceed as scheduled.      Singh Beck MD  8/30/2021    PCP:  Shawna Pelayo

## 2021-08-30 NOTE — ANESTHESIA CARE TRANSFER NOTE
Patient: Vandana Crowder    Procedure(s):  COLONOSCOPY, WITH POLYPECTOMY    Diagnosis: Colon cancer screening [Z12.11]  Diagnosis Additional Information: No value filed.    Anesthesia Type:   MAC     Note:    Oropharynx: oropharynx clear of all foreign objects  Level of Consciousness: awake  Oxygen Supplementation: room air    Independent Airway: airway patency satisfactory and stable  Dentition: dentition unchanged  Vital Signs Stable: post-procedure vital signs reviewed and stable  Report to RN Given: handoff report given  Patient transferred to: Phase II    Handoff Report: Identifed the Patient, Identified the Reponsible Provider, Reviewed the pertinent medical history, Discussed the surgical course, Reviewed Intra-OP anesthesia mangement and issues during anesthesia, Set expectations for post-procedure period and Allowed opportunity for questions and acknowledgement of understanding      Vitals:  Vitals Value Taken Time   BP     Temp     Pulse     Resp     SpO2         Electronically Signed By: USMAN Sanders CRNA  August 30, 2021  3:44 PM

## 2021-09-01 LAB
PATH REPORT.COMMENTS IMP SPEC: NORMAL
PATH REPORT.FINAL DX SPEC: NORMAL
PATH REPORT.GROSS SPEC: NORMAL
PATH REPORT.MICROSCOPIC SPEC OTHER STN: NORMAL
PATH REPORT.RELEVANT HX SPEC: NORMAL
PHOTO IMAGE: NORMAL

## 2021-09-02 PROBLEM — Z86.0100 HISTORY OF COLONIC POLYPS: Status: ACTIVE | Noted: 2021-09-02

## 2021-09-03 PROBLEM — J44.9 CHRONIC OBSTRUCTIVE PULMONARY DISEASE, UNSPECIFIED COPD TYPE (H): Status: ACTIVE | Noted: 2021-09-03

## 2021-09-10 ENCOUNTER — TELEPHONE (OUTPATIENT)
Dept: OTOLARYNGOLOGY | Facility: CLINIC | Age: 61
End: 2021-09-10

## 2021-09-10 NOTE — TELEPHONE ENCOUNTER
Spoke to patient in regards to offering a sooner appointment with provider on 10/1, as availability came up. Pt declinied this appt as it was early in the morning(8:30 am). Pt stated she would prefer something in the afternoon around 3:30pm. Advised pt we would keep this in mind if any other openings became available.

## 2021-10-03 ENCOUNTER — HEALTH MAINTENANCE LETTER (OUTPATIENT)
Age: 61
End: 2021-10-03

## 2021-10-24 ENCOUNTER — TELEPHONE (OUTPATIENT)
Dept: SLEEP MEDICINE | Facility: CLINIC | Age: 61
End: 2021-10-24

## 2021-10-25 NOTE — TELEPHONE ENCOUNTER
Called patient who answered and stated that she is sick with what she thinks is the flu and could not come in for her appointment and will need to reschedule.

## 2021-11-08 ENCOUNTER — PRE VISIT (OUTPATIENT)
Dept: OTOLARYNGOLOGY | Facility: CLINIC | Age: 61
End: 2021-11-08

## 2021-11-30 DIAGNOSIS — E78.2 MIXED HYPERLIPIDEMIA: ICD-10-CM

## 2021-11-30 DIAGNOSIS — I10 BENIGN ESSENTIAL HYPERTENSION: ICD-10-CM

## 2021-11-30 RX ORDER — AMLODIPINE BESYLATE 10 MG/1
10 TABLET ORAL DAILY
Qty: 90 TABLET | Refills: 3 | Status: SHIPPED | OUTPATIENT
Start: 2021-11-30

## 2021-11-30 RX ORDER — SIMVASTATIN 20 MG
20 TABLET ORAL DAILY
Qty: 90 TABLET | Refills: 3 | Status: SHIPPED | OUTPATIENT
Start: 2021-11-30

## 2021-11-30 NOTE — TELEPHONE ENCOUNTER
"Request for medication refill:  amLODIPine (NORVASC) 10 MG tablet    Providers if patient needs an appointment and you are willing to give a one month supply please refill for one month and  send a letter/MyChart using \".SMILLIMITEDREFILL\" .smillimited and route chart to \"[facility] \" (Giving one month refill in non controlled medications is strongly recommended before denial)    If refill has been denied, meaning absolutely no refills without visit, please complete the smart phrase \".smirxrefuse\" and route it to the \"[facility] MED REFILLS\"  pool to inform the patient and the pharmacy.    Hayden Garrett MA        "

## 2022-02-17 DIAGNOSIS — E78.2 MIXED HYPERLIPIDEMIA: ICD-10-CM

## 2022-02-17 RX ORDER — ASPIRIN 81 MG/1
TABLET, COATED ORAL
Qty: 90 TABLET | Refills: 0 | Status: SHIPPED | OUTPATIENT
Start: 2022-02-17

## 2022-02-17 NOTE — TELEPHONE ENCOUNTER
"Request for medication refill: aspirin    Providers if patient needs an appointment and you are willing to give a one month supply please refill for one month and  send a letter/MyChart using \".SMILLIMITEDREFILL\" .smillimited and route chart to \"[facility] \" (Giving one month refill in non controlled medications is strongly recommended before denial)    If refill has been denied, meaning absolutely no refills without visit, please complete the smart phrase \".smirxrefuse\" and route it to the \"[facility] MED REFILLS\"  pool to inform the patient and the pharmacy.    Angie Quintero        "

## 2022-02-28 ENCOUNTER — ANCILLARY PROCEDURE (OUTPATIENT)
Dept: GENERAL RADIOLOGY | Facility: CLINIC | Age: 62
End: 2022-02-28
Attending: PHYSICIAN ASSISTANT
Payer: COMMERCIAL

## 2022-02-28 ENCOUNTER — OFFICE VISIT (OUTPATIENT)
Dept: URGENT CARE | Facility: URGENT CARE | Age: 62
End: 2022-02-28
Payer: COMMERCIAL

## 2022-02-28 VITALS
TEMPERATURE: 97.8 F | RESPIRATION RATE: 16 BRPM | OXYGEN SATURATION: 97 % | SYSTOLIC BLOOD PRESSURE: 144 MMHG | DIASTOLIC BLOOD PRESSURE: 70 MMHG | HEART RATE: 94 BPM

## 2022-02-28 DIAGNOSIS — M25.562 ACUTE PAIN OF LEFT KNEE: Primary | ICD-10-CM

## 2022-02-28 LAB
BASOPHILS # BLD AUTO: 0 10E3/UL (ref 0–0.2)
BASOPHILS NFR BLD AUTO: 0 %
D DIMER PPP FEU-MCNC: 0.54 UG/ML FEU (ref 0–0.5)
EOSINOPHIL # BLD AUTO: 0.1 10E3/UL (ref 0–0.7)
EOSINOPHIL NFR BLD AUTO: 2 %
ERYTHROCYTE [DISTWIDTH] IN BLOOD BY AUTOMATED COUNT: 14 % (ref 10–15)
ERYTHROCYTE [SEDIMENTATION RATE] IN BLOOD BY WESTERGREN METHOD: 28 MM/HR (ref 0–30)
HCT VFR BLD AUTO: 39.9 % (ref 35–47)
HGB BLD-MCNC: 13.1 G/DL (ref 11.7–15.7)
IMM GRANULOCYTES # BLD: 0 10E3/UL
IMM GRANULOCYTES NFR BLD: 0 %
LYMPHOCYTES # BLD AUTO: 2.3 10E3/UL (ref 0.8–5.3)
LYMPHOCYTES NFR BLD AUTO: 45 %
MCH RBC QN AUTO: 30.3 PG (ref 26.5–33)
MCHC RBC AUTO-ENTMCNC: 32.8 G/DL (ref 31.5–36.5)
MCV RBC AUTO: 92 FL (ref 78–100)
MONOCYTES # BLD AUTO: 0.5 10E3/UL (ref 0–1.3)
MONOCYTES NFR BLD AUTO: 11 %
NEUTROPHILS # BLD AUTO: 2.1 10E3/UL (ref 1.6–8.3)
NEUTROPHILS NFR BLD AUTO: 42 %
PLATELET # BLD AUTO: 234 10E3/UL (ref 150–450)
RBC # BLD AUTO: 4.32 10E6/UL (ref 3.8–5.2)
URATE SERPL-MCNC: 6.4 MG/DL (ref 2.6–6)
WBC # BLD AUTO: 5 10E3/UL (ref 4–11)

## 2022-02-28 PROCEDURE — 36415 COLL VENOUS BLD VENIPUNCTURE: CPT | Performed by: PHYSICIAN ASSISTANT

## 2022-02-28 PROCEDURE — 99204 OFFICE O/P NEW MOD 45 MIN: CPT | Performed by: PHYSICIAN ASSISTANT

## 2022-02-28 PROCEDURE — 73562 X-RAY EXAM OF KNEE 3: CPT | Mod: LT | Performed by: RADIOLOGY

## 2022-02-28 PROCEDURE — 84550 ASSAY OF BLOOD/URIC ACID: CPT | Performed by: PHYSICIAN ASSISTANT

## 2022-02-28 PROCEDURE — 85652 RBC SED RATE AUTOMATED: CPT | Performed by: PHYSICIAN ASSISTANT

## 2022-02-28 PROCEDURE — 85379 FIBRIN DEGRADATION QUANT: CPT | Performed by: PHYSICIAN ASSISTANT

## 2022-02-28 PROCEDURE — 85025 COMPLETE CBC W/AUTO DIFF WBC: CPT | Performed by: PHYSICIAN ASSISTANT

## 2022-02-28 RX ORDER — PREDNISONE 20 MG/1
TABLET ORAL
Qty: 20 TABLET | Refills: 0 | Status: SHIPPED | OUTPATIENT
Start: 2022-02-28

## 2022-02-28 NOTE — PROGRESS NOTES
Acute pain of left knee  - XR Knee Left 3 Views  - CBC with platelets and differential; Future  - D dimer, quantitative; Future  - ESR: Erythrocyte sedimentation rate; Future  - Uric acid; Future  - CBC with platelets and differential  - D dimer, quantitative  - ESR: Erythrocyte sedimentation rate  - Uric acid  - predniSONE (DELTASONE) 20 MG tablet; Take 3 tabs by mouth daily x 3 days, then 2 tabs daily x 3 days, then 1 tab daily x 3 days, then 1/2 tab daily x 3 days.    45 minutes spent on the date of the encounter doing chart review, history and exam, documentation and further activities per the note     See Patient Instructions  Patient Instructions       Patient Education     RICE     Rest an injury, elevate it, and use ice and compression as directed.   RICE stands for rest, ice, compression, and elevation. These can limit pain and swelling after an injury. RICE may be recommended to help treat breaks (fractures), sprains, strains, and bruises or bumps.   Home care  Here are the details of RICE:    Rest. Limit the use of the injured body part. This helps prevent further damage to the body part and gives it time to heal. In some cases, you may need a sling, brace, splint, or cast to help keep the body part still until it has healed.    Ice. Applying ice right after an injury helps relieve pain and swelling. To make an ice pack, put ice cubes in a plastic bag that seals at the top. Wrap the bag in a clean, thin towel or cloth. Then place it over the injured area. Do this for 10 to 15 minutes every 3 to 4 hours. Continue for the next 1 to 3 days or until your symptoms improve. Never put ice directly on your skin. Don't ice an area longer than 15 minutes at a time.    Compression. Putting pressure on an injury helps reduce swelling and provides support. Wrap the injured area firmly with an elastic bandage or wrap. Make sure not to wrap the bandage too tightly or you will cut off blood flow to the injured area. If  your bandage loosens, rewrap it.    Elevation. Keeping an injury raised or elevated above the level of your heart reduces swelling, pain, and throbbing. For instance, if you have a broken leg, it may help to rest your leg on several pillows when sitting or lying down. Try to keep the injured area elevated as often as possible.  Follow-up care  Follow up with your healthcare provider, or as advised.  When to seek medical advice  Call your healthcare provider right away if any of these occur:    Fever of 100.4 F (38 C) or higher, or as directed by your healthcare provider    Chills    Increased pain or swelling in the injured body part    Injured body part becomes cold, blue, numb, or tingly    Signs of infection. These include warmth in the skin, redness, drainage, or bad smell coming from the injured body part.  [employer] last reviewed this educational content on 6/1/2018 2000-2021 [employer]. All rights reserved. This information is not intended as a substitute for professional medical care. Always follow your healthcare professional's instructions.               Mark Gerber PA-C  [facility]    Subjective   61 year old who presents to clinic today for the following health issues:    Musculoskeletal Problem and Urgent Care       HPI     Left leg swelling in knee, pain into calf and some in back of upper leg x 2 weeks.  Also gets chest pain and headaches at times.    Pain History:  When did you first notice your pain? - 1 to 6 weeks   Where in your body do you have pain? Musculoskeletal problem/pain  Onset/Duration: 2 weeks with gradual onset   Description  Location: Left knee   Joint Swelling: YES  Redness: YES  Pain: YES  Warmth: YES  Intensity:  moderate  Progression of Symptoms:  worsening  Accompanying signs and symptoms:   Fevers: no  Numbness/tingling/weakness: no  History  Trauma to the area: no  Recent illness:  no  Previous similar problem: no  Previous evaluation:   no  Precipitating or alleviating factors:  Aggravating factors include: walking and climbing stairs  Therapies tried and outcome: rest/inactivity     Review of Systems   Review of Systems   See HPI     Objective    Temp: 97.8  F (36.6  C) Temp src: Temporal BP: (!) 144/70 Pulse: 94   Resp: 16 SpO2: 97 %       Physical Exam   Physical Exam  Constitutional:       General: She is not in acute distress.     Appearance: Normal appearance. She is normal weight. She is not ill-appearing, toxic-appearing or diaphoretic.   HENT:      Head: Normocephalic and atraumatic.   Cardiovascular:      Rate and Rhythm: Normal rate.      Pulses: Normal pulses.   Pulmonary:      Effort: Pulmonary effort is normal. No respiratory distress.   Musculoskeletal:      Left knee: Swelling and erythema present. No deformity or bony tenderness. Normal range of motion. No tenderness.   Neurological:      General: No focal deficit present.      Mental Status: She is alert and oriented to person, place, and time. Mental status is at baseline.      Gait: Gait normal.   Psychiatric:         Mood and Affect: Mood normal.         Behavior: Behavior normal.         Thought Content: Thought content normal.         Judgment: Judgment normal.          Xray - Reviewed and interpreted by me.  Mild arthritis. No fracture.

## 2022-03-20 ENCOUNTER — HEALTH MAINTENANCE LETTER (OUTPATIENT)
Age: 62
End: 2022-03-20

## 2022-09-10 ENCOUNTER — HEALTH MAINTENANCE LETTER (OUTPATIENT)
Age: 62
End: 2022-09-10

## 2023-09-29 ENCOUNTER — APPOINTMENT (OUTPATIENT)
Dept: GENERAL RADIOLOGY | Age: 63
End: 2023-09-29

## 2023-09-29 ENCOUNTER — APPOINTMENT (OUTPATIENT)
Dept: GENERAL RADIOLOGY | Age: 63
End: 2023-09-29
Attending: SURGERY

## 2023-09-29 ENCOUNTER — HOSPITAL ENCOUNTER (EMERGENCY)
Age: 63
Discharge: HOME | End: 2023-09-30

## 2023-09-29 ENCOUNTER — APPOINTMENT (OUTPATIENT)
Dept: CT IMAGING | Age: 63
End: 2023-09-29
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
## 2023-09-29 LAB
ABO + RH BLD: NORMAL
ABO + RH BLD: NORMAL
ALBUMIN SERPL-MCNC: 3.7 G/DL (ref 3.6–5.1)
ALBUMIN/GLOB SERPL: 1 {RATIO} (ref 1–2.4)
ALP SERPL-CCNC: 78 UNITS/L (ref 45–117)
ALT SERPL-CCNC: 16 UNITS/L
ANION GAP SERPL CALC-SCNC: 11 MMOL/L (ref 7–19)
APTT PPP: 28 SEC (ref 22–32)
AST SERPL-CCNC: 19 UNITS/L
BASOPHILS # BLD: 0 K/MCL (ref 0–0.3)
BASOPHILS NFR BLD: 0 %
BILIRUB SERPL-MCNC: 0.2 MG/DL (ref 0.2–1)
BLD GP AB SCN SERPL QL GEL: NEGATIVE
BUN SERPL-MCNC: 14 MG/DL (ref 6–20)
BUN/CREAT SERPL: 18 (ref 7–25)
CALCIUM SERPL-MCNC: 9.1 MG/DL (ref 8.4–10.2)
CHLORIDE SERPL-SCNC: 112 MMOL/L (ref 97–110)
CO2 SERPL-SCNC: 22 MMOL/L (ref 21–32)
CREAT SERPL-MCNC: 0.79 MG/DL (ref 0.51–0.95)
DEPRECATED RDW RBC: 45.5 FL (ref 39–50)
EGFRCR SERPLBLD CKD-EPI 2021: 82 ML/MIN/{1.73_M2}
EOSINOPHIL # BLD: 0.1 K/MCL (ref 0–0.5)
EOSINOPHIL NFR BLD: 2 %
ERYTHROCYTE [DISTWIDTH] IN BLOOD: 13.4 % (ref 11–15)
ETHANOL SERPL-MCNC: 108 MG/DL
FASTING DURATION TIME PATIENT: ABNORMAL H
GLOBULIN SER-MCNC: 3.7 G/DL (ref 2–4)
GLUCOSE SERPL-MCNC: 112 MG/DL (ref 70–99)
HCT VFR BLD CALC: 37.2 % (ref 36–46.5)
HGB BLD-MCNC: 12.5 G/DL (ref 12–15.5)
IMM GRANULOCYTES # BLD AUTO: 0 K/MCL (ref 0–0.2)
IMM GRANULOCYTES # BLD: 0 %
INR PPP: 0.9
LYMPHOCYTES # BLD: 2.5 K/MCL (ref 1–4)
LYMPHOCYTES NFR BLD: 40 %
MCH RBC QN AUTO: 30.9 PG (ref 26–34)
MCHC RBC AUTO-ENTMCNC: 33.6 G/DL (ref 32–36.5)
MCV RBC AUTO: 91.9 FL (ref 78–100)
MONOCYTES # BLD: 0.6 K/MCL (ref 0.3–0.9)
MONOCYTES NFR BLD: 9 %
NEUTROPHILS # BLD: 3 K/MCL (ref 1.8–7.7)
NEUTROPHILS NFR BLD: 49 %
NRBC BLD MANUAL-RTO: 0 /100 WBC
PLATELET # BLD AUTO: 230 K/MCL (ref 140–450)
POTASSIUM SERPL-SCNC: 3.5 MMOL/L (ref 3.4–5.1)
PROT SERPL-MCNC: 7.4 G/DL (ref 6.4–8.2)
PROTHROMBIN TIME: 10.3 SEC (ref 9.7–11.8)
RBC # BLD: 4.05 MIL/MCL (ref 4–5.2)
SODIUM SERPL-SCNC: 141 MMOL/L (ref 135–145)
TYPE AND SCREEN EXPIRATION DATE: NORMAL
WBC # BLD: 6.3 K/MCL (ref 4.2–11)

## 2023-09-29 PROCEDURE — 80053 COMPREHEN METABOLIC PANEL: CPT | Performed by: SURGERY

## 2023-09-29 PROCEDURE — 93005 ELECTROCARDIOGRAM TRACING: CPT

## 2023-09-29 PROCEDURE — G1004 CDSM NDSC: HCPCS

## 2023-09-29 PROCEDURE — 10004180 HB COUNTER-TRANSPORT

## 2023-09-29 PROCEDURE — 71275 CT ANGIOGRAPHY CHEST: CPT

## 2023-09-29 PROCEDURE — 85730 THROMBOPLASTIN TIME PARTIAL: CPT | Performed by: SURGERY

## 2023-09-29 PROCEDURE — 93010 ELECTROCARDIOGRAM REPORT: CPT | Performed by: INTERNAL MEDICINE

## 2023-09-29 PROCEDURE — 86850 RBC ANTIBODY SCREEN: CPT | Performed by: SURGERY

## 2023-09-29 PROCEDURE — 36415 COLL VENOUS BLD VENIPUNCTURE: CPT

## 2023-09-29 PROCEDURE — 72131 CT LUMBAR SPINE W/O DYE: CPT

## 2023-09-29 PROCEDURE — 85610 PROTHROMBIN TIME: CPT | Performed by: SURGERY

## 2023-09-29 PROCEDURE — 72125 CT NECK SPINE W/O DYE: CPT

## 2023-09-29 PROCEDURE — 72128 CT CHEST SPINE W/O DYE: CPT

## 2023-09-29 PROCEDURE — 73560 X-RAY EXAM OF KNEE 1 OR 2: CPT

## 2023-09-29 PROCEDURE — 82077 ASSAY SPEC XCP UR&BREATH IA: CPT | Performed by: SURGERY

## 2023-09-29 PROCEDURE — 85025 COMPLETE CBC W/AUTO DIFF WBC: CPT | Performed by: SURGERY

## 2023-09-29 PROCEDURE — 99285 EMERGENCY DEPT VISIT HI MDM: CPT

## 2023-09-29 PROCEDURE — 99284 EMERGENCY DEPT VISIT MOD MDM: CPT

## 2023-09-29 PROCEDURE — 70450 CT HEAD/BRAIN W/O DYE: CPT

## 2023-09-29 PROCEDURE — 72170 X-RAY EXAM OF PELVIS: CPT

## 2023-09-29 PROCEDURE — 71045 X-RAY EXAM CHEST 1 VIEW: CPT

## 2023-09-29 PROCEDURE — 86900 BLOOD TYPING SEROLOGIC ABO: CPT | Performed by: SURGERY

## 2023-09-29 ASSESSMENT — PAIN SCALES - WONG BAKER: WONGBAKER_NUMERICALRESPONSE: 4

## 2023-09-30 VITALS
RESPIRATION RATE: 15 BRPM | WEIGHT: 205 LBS | OXYGEN SATURATION: 97 % | HEIGHT: 65 IN | DIASTOLIC BLOOD PRESSURE: 114 MMHG | BODY MASS INDEX: 34.16 KG/M2 | SYSTOLIC BLOOD PRESSURE: 182 MMHG | HEART RATE: 90 BPM

## 2023-09-30 LAB
ATRIAL RATE (BPM): 87
P AXIS (DEGREES): 74
PR-INTERVAL (MSEC): 148
QRS-INTERVAL (MSEC): 86
QT-INTERVAL (MSEC): 398
QTC: 479
R AXIS (DEGREES): 69
RAINBOW EXTRA TUBES HOLD SPECIMEN: NORMAL
REPORT TEXT: NORMAL
T AXIS (DEGREES): 58
VENTRICULAR RATE EKG/MIN (BPM): 87

## 2023-09-30 PROCEDURE — 10002805 HB CONTRAST AGENT

## 2023-09-30 RX ADMIN — IOHEXOL 75 ML: 350 INJECTION, SOLUTION INTRAVENOUS at 00:40

## 2023-09-30 ASSESSMENT — PAIN SCALES - GENERAL: PAINLEVEL_OUTOF10: 0

## 2023-10-01 ENCOUNTER — HEALTH MAINTENANCE LETTER (OUTPATIENT)
Age: 63
End: 2023-10-01

## 2024-04-28 ENCOUNTER — HEALTH MAINTENANCE LETTER (OUTPATIENT)
Age: 64
End: 2024-04-28

## 2024-07-07 ENCOUNTER — HEALTH MAINTENANCE LETTER (OUTPATIENT)
Age: 64
End: 2024-07-07

## 2024-09-09 DIAGNOSIS — Z12.31 ENCOUNTER FOR SCREENING MAMMOGRAM FOR MALIGNANT NEOPLASM OF BREAST: Primary | ICD-10-CM

## 2024-09-12 DIAGNOSIS — M25.562 KNEE PAIN, BILATERAL: ICD-10-CM

## 2024-09-12 DIAGNOSIS — M25.561 KNEE PAIN, BILATERAL: ICD-10-CM

## 2024-09-12 DIAGNOSIS — M19.90 OSTEOARTHRITIS: Primary | ICD-10-CM

## 2024-09-27 DIAGNOSIS — J43.9 EMPHYSEMA LUNG  (CMD): Primary | ICD-10-CM

## 2024-09-30 ENCOUNTER — HOSPITAL ENCOUNTER (OUTPATIENT)
Dept: GENERAL RADIOLOGY | Age: 64
Discharge: HOME | End: 2024-09-30
Attending: REGISTERED NURSE

## 2024-09-30 ENCOUNTER — APPOINTMENT (OUTPATIENT)
Dept: GENERAL RADIOLOGY | Age: 64
End: 2024-09-30
Attending: REGISTERED NURSE

## 2024-09-30 ENCOUNTER — HOSPITAL ENCOUNTER (OUTPATIENT)
Dept: MAMMOGRAPHY | Age: 64
Discharge: HOME | End: 2024-09-30
Attending: REGISTERED NURSE

## 2024-09-30 DIAGNOSIS — M25.561 KNEE PAIN, BILATERAL: ICD-10-CM

## 2024-09-30 DIAGNOSIS — M19.90 OSTEOARTHRITIS: ICD-10-CM

## 2024-09-30 DIAGNOSIS — Z12.31 ENCOUNTER FOR SCREENING MAMMOGRAM FOR MALIGNANT NEOPLASM OF BREAST: ICD-10-CM

## 2024-09-30 DIAGNOSIS — M25.562 KNEE PAIN, BILATERAL: ICD-10-CM

## 2024-09-30 DIAGNOSIS — J43.9 EMPHYSEMA LUNG  (CMD): ICD-10-CM

## 2024-09-30 PROCEDURE — 73562 X-RAY EXAM OF KNEE 3: CPT

## 2024-09-30 PROCEDURE — 77067 SCR MAMMO BI INCL CAD: CPT

## 2024-09-30 PROCEDURE — 71046 X-RAY EXAM CHEST 2 VIEWS: CPT

## 2024-10-15 DIAGNOSIS — R14.0 GASTRIC TYMPANY: ICD-10-CM

## 2024-10-15 DIAGNOSIS — B18.2 CHRONIC HEPATITIS C WITH HEPATIC COMA  (CMD): Primary | ICD-10-CM

## 2024-10-31 ENCOUNTER — HOSPITAL ENCOUNTER (OUTPATIENT)
Dept: ULTRASOUND IMAGING | Age: 64
Discharge: HOME | End: 2024-10-31
Attending: REGISTERED NURSE

## 2024-10-31 DIAGNOSIS — R14.0 GASTRIC TYMPANY: ICD-10-CM

## 2024-10-31 DIAGNOSIS — B18.2 CHRONIC HEPATITIS C WITH HEPATIC COMA  (CMD): ICD-10-CM

## 2024-10-31 PROCEDURE — 76700 US EXAM ABDOM COMPLETE: CPT

## 2024-11-13 DIAGNOSIS — B18.2 CHRONIC HEPATITIS C  (CMD): Primary | ICD-10-CM

## 2024-11-13 DIAGNOSIS — R14.0 ABDOMINAL DISTENTION: ICD-10-CM

## 2024-12-04 ENCOUNTER — APPOINTMENT (OUTPATIENT)
Dept: ULTRASOUND IMAGING | Age: 64
End: 2024-12-04
Attending: REGISTERED NURSE

## 2024-12-04 DIAGNOSIS — R14.0 ABDOMINAL DISTENTION: ICD-10-CM

## 2024-12-04 DIAGNOSIS — B18.2 CHRONIC HEPATITIS C  (CMD): ICD-10-CM

## 2024-12-04 PROCEDURE — 76700 US EXAM ABDOM COMPLETE: CPT | Performed by: RADIOLOGY

## (undated) DEVICE — SPECIMEN CONTAINER 3OZ W/FORMALIN 59901

## (undated) DEVICE — SOL WATER IRRIG 500ML BOTTLE 2F7113

## (undated) DEVICE — KIT ENDO TURNOVER/PROCEDURE CARRY-ON 101822

## (undated) DEVICE — SUCTION MANIFOLD NEPTUNE 2 SYS 1 PORT 702-025-000

## (undated) DEVICE — TUBING SUCTION 12"X1/4" N612

## (undated) DEVICE — GOWN IMPERVIOUS 2XL BLUE

## (undated) RX ORDER — ALBUTEROL SULFATE 0.83 MG/ML
SOLUTION RESPIRATORY (INHALATION)
Status: DISPENSED
Start: 2021-08-18